# Patient Record
Sex: FEMALE | Race: WHITE
[De-identification: names, ages, dates, MRNs, and addresses within clinical notes are randomized per-mention and may not be internally consistent; named-entity substitution may affect disease eponyms.]

---

## 2020-06-05 ENCOUNTER — HOSPITAL ENCOUNTER (INPATIENT)
Dept: HOSPITAL 53 - M PCU | Age: 85
LOS: 3 days | Discharge: NURSING, REHAB OR LONG-TERM CARE | DRG: 481 | End: 2020-06-08
Attending: INTERNAL MEDICINE | Admitting: INTERNAL MEDICINE
Payer: MEDICARE

## 2020-06-05 VITALS — BODY MASS INDEX: 26.45 KG/M2 | WEIGHT: 158.73 LBS | HEIGHT: 65 IN

## 2020-06-05 VITALS — DIASTOLIC BLOOD PRESSURE: 72 MMHG | SYSTOLIC BLOOD PRESSURE: 150 MMHG

## 2020-06-05 DIAGNOSIS — Z79.82: ICD-10-CM

## 2020-06-05 DIAGNOSIS — F41.9: ICD-10-CM

## 2020-06-05 DIAGNOSIS — R32: ICD-10-CM

## 2020-06-05 DIAGNOSIS — M19.90: ICD-10-CM

## 2020-06-05 DIAGNOSIS — Z95.0: ICD-10-CM

## 2020-06-05 DIAGNOSIS — I48.20: ICD-10-CM

## 2020-06-05 DIAGNOSIS — Z79.899: ICD-10-CM

## 2020-06-05 DIAGNOSIS — K21.9: ICD-10-CM

## 2020-06-05 DIAGNOSIS — E11.40: ICD-10-CM

## 2020-06-05 DIAGNOSIS — Z88.5: ICD-10-CM

## 2020-06-05 DIAGNOSIS — Z91.040: ICD-10-CM

## 2020-06-05 DIAGNOSIS — E03.9: ICD-10-CM

## 2020-06-05 DIAGNOSIS — I12.9: ICD-10-CM

## 2020-06-05 DIAGNOSIS — K44.9: ICD-10-CM
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
## 2020-06-05 DIAGNOSIS — N18.3: ICD-10-CM

## 2020-06-05 DIAGNOSIS — S32.039A: ICD-10-CM

## 2020-06-05 DIAGNOSIS — S72.21XA: Primary | ICD-10-CM

## 2020-06-05 DIAGNOSIS — Z88.0: ICD-10-CM

## 2020-06-05 DIAGNOSIS — R29.6: ICD-10-CM

## 2020-06-05 DIAGNOSIS — D72.829: ICD-10-CM

## 2020-06-05 DIAGNOSIS — I25.10: ICD-10-CM

## 2020-06-05 DIAGNOSIS — E78.5: ICD-10-CM
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
## 2020-06-05 DIAGNOSIS — H83.09: ICD-10-CM

## 2020-06-05 LAB
ALBUMIN SERPL BCG-MCNC: 2.8 GM/DL (ref 3.2–5.2)
ALT SERPL W P-5'-P-CCNC: 18 U/L (ref 12–78)
BILIRUB SERPL-MCNC: 0.4 MG/DL (ref 0.2–1)
BUN SERPL-MCNC: 27 MG/DL (ref 7–18)
CALCIUM SERPL-MCNC: 9 MG/DL (ref 8.8–10.2)
CHLORIDE SERPL-SCNC: 101 MEQ/L (ref 98–107)
CO2 SERPL-SCNC: 28 MEQ/L (ref 21–32)
CREAT SERPL-MCNC: 1.21 MG/DL (ref 0.55–1.3)
GFR SERPL CREATININE-BSD FRML MDRD: 45 ML/MIN/{1.73_M2} (ref 32–?)
GLUCOSE SERPL-MCNC: 127 MG/DL (ref 70–100)
HCT VFR BLD AUTO: 35 % (ref 36–47)
HGB BLD-MCNC: 11.7 G/DL (ref 12–15.5)
INR PPP: 1.53
MCH RBC QN AUTO: 27.7 PG (ref 27–33)
MCHC RBC AUTO-ENTMCNC: 33.4 G/DL (ref 32–36.5)
MCV RBC AUTO: 82.9 FL (ref 80–96)
PLATELET # BLD AUTO: 353 10^3/UL (ref 150–450)
POTASSIUM SERPL-SCNC: 4 MEQ/L (ref 3.5–5.1)
PROT SERPL-MCNC: 6.3 GM/DL (ref 6.4–8.2)
PROTHROMBIN TIME: 18.1 SECONDS (ref 11.8–14)
RBC # BLD AUTO: 4.22 10^6/UL (ref 4–5.4)
SODIUM SERPL-SCNC: 136 MEQ/L (ref 136–145)
WBC # BLD AUTO: 24.2 10^3/UL (ref 4–10)

## 2020-06-05 RX ADMIN — METOPROLOL TARTRATE SCH MG: 50 TABLET, FILM COATED ORAL at 22:28

## 2020-06-05 RX ADMIN — MECLIZINE HYDROCHLORIDE SCH MG: 25 TABLET ORAL at 22:27

## 2020-06-05 RX ADMIN — SODIUM CHLORIDE SCH UNITS: 4.5 INJECTION, SOLUTION INTRAVENOUS at 22:28

## 2020-06-05 RX ADMIN — DOCUSATE SODIUM,SENNOSIDES SCH TAB: 50; 8.6 TABLET, FILM COATED ORAL at 22:28

## 2020-06-05 RX ADMIN — ATORVASTATIN CALCIUM SCH MG: 20 TABLET, FILM COATED ORAL at 22:28

## 2020-06-05 RX ADMIN — GABAPENTIN SCH MG: 100 CAPSULE ORAL at 22:28

## 2020-06-05 NOTE — HPEPDOC
General


Date of Admission


2020 at 19:35


Date of Service:  2020


Chief Complaint


The patient is a 85-year-old female admitted with a reason for visit of Rt 

Femoral Neck Fx.


Source:  Patient, RN/MD, Old records





History of Present Illness


85 year old female was admitted initially to [facility] rehabilitation on 

20 after a fall in her senior living apartment when she fell forward and 

to the right while trying to pull a rug which was stuck below the chair leg and 

was found to have L3 vertebral inferior end plate fracture. She was getting pain

control and rehab there. This am she had another fall in the shower at the rehab

unit. She slipped on wet floor when she just stepped to the side and went down 

on her right hip and heard a snap. Xray of the pelvis there showed a femoral 

neck fracture so she was transferred here. She complains of pain in the right 

groin about 6/10 sharp aching in nature going down to the thigh associated with 

muscle spasms on minimal movement.





Past Medical History


Medical History


L3 inferior plate fracture as per CT on 20 new since 19


Recurrent falls


PAD s/p bilateral leg bypass--bilateral illiac occlusion


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


A fib s/p ablasion


Pacemaker


Hypertension


Hyperlipidemia


CAD


DM


Neuropathy


hypothyroid


OA


Chronic back pain


Vertigo/labrynthitis


GERD/ hiatal hernia


urge incontinence


CKD


Lumber spinal stenosis


Anxiety


seasonal allergies


Surgical History


cholecystectomy, hystrectomy, pacemaker, cardiac ablation, liac graft, cervical 

spice fixed with tiff 7 years ago after a fall and trauma.





Family History


Significant Family History:  Cancer (blood cancer daughter now ), 

Diabetes (sister), Heart disease (father, brothers, 2 sisters.)





Social History


* Smoker:  Denies


Alcohol:  Denies


Drugs:  denies





A-FIB/CHADSVASC


A-FIB History


Current/History of A-Fib/PAF?:  Yes


Current PO Anticoag Therapy:  Yes





Review of Systems


Constitutional:  Denies: Chills, Fever, Night Sweats


Eyes:  Denies: Pain, Vision change


ENT:  Denies: Head Aches, Ear Pain, Dysphagia


Skin:  Denies: Rash, Lesions, Breakdown


Pulmonary:  Denies: Dyspnea, Cough


Cardiovascular:  Reports: Lt Headedness; 


   Denies: Chest Pain, Palpitations, Orthopnea, Paroxysmal Noc. Dyspnea


Gastrointestinal:  Reports: Constipation; 


   Denies: Nausea, Vomiting, Abdominal Pain


Genitourinary:  Reports: Incontinence, Other Symptoms (munoz in place)


Hematologic:  Denies: Bruising, Bleeding Excessively


Musculoskeletal:  Reports: Back Pain, Joint Pain, Spasms





Physical Examination


General Exam:  Positive: Alert, Cooperative, No Acute Distress


Eye Exam:  Positive: PERRLA, Conjunctiva & lids normal, EOMI; 


   Negative: Sclera icteric


ENT Exam:  Positive: Atraumatic, Mucous membr. moist/pink, Pharynx Normal


Neck Exam:  Positive: Other (neck difficult to move due to prior surgery); 


   Negative: JVD, thyromegaly


Chest Exam:  Positive: Clear to auscultation, Normal air movement


Heart Exam:  Positive: Rate Normal, Regular Rhythm, Normal S1, Normal S2; 


   Negative: Murmurs, Rubs


Telemetry:  Positive: Other Telemetry: (paced)


Abdomen Exam:  Positive: Normal bowel sounds, Soft; 


   Negative: Tenderness, Hepatospenomegaly


Extremity Exam:  Positive: Normal pulses, Tenderness (right groin), Swelling 

(right groin and thigh); 


   Negative: Clubbing, Cyanosis, Edema


Skin Exam:  Positive: Nl turgor and temperature; 


   Negative: Breakdown, Lesion


Neuro Exam:  Positive: Normal Speech, Strength at 5/5 X4 ext, Normal Tone





 Assessment/Plan


85 year old female was admitted initially to [facility] skilled rehabilitation on 

20 after a fall in her senior living apartment when she fell forward and 

to the right while trying to pull a rug which was stuck below the chair leg and 

was found to have L3 vertebral inferior end plate fracture. She was getting pain

control and rehab there. This am she had another fall in the shower at the rehab

unit. She slipped on wet floor when she just stepped to the side and went down 

on her right hip and heard a snap. Xray of the pelvis there showed a femoral 

neck fracture so she was transferred here.





Right fracture neck femur


will get hip and femur xray


Hold eliquis, cxr and ekg


Hold Valsartan on the night prior to surgery. 


pin control with narcotics, zofran 


dvt prophylaxis heparin. 


Will have to wait at least 48 hours before cleared for surgery. Last dose of 

eliquis 20 am. 


COVID test. 


Bed rest, munoz


Ortho Dr Bland. 





L3 inferior plate fracture as per CT on 20 new since 19


pain control with oxycodone, morphine tramadol





Leucocytosis


reactive demargination and also due to steroids


patient was getting methyl prednisone at the other hospital. 





PAD s/p bilateral leg bypass--bilateral illiac occlusion


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


ASA, statin, 





A fib s/p ablation/Pacemaker


on eliquis , now on hold


metoprolol





Hypertension


metoprolol, amlodipine,valsartan





Hyperlipidemia


statin





CAD


ASA, statin, metoprolol





DM


patient reported borderline not on any eds


sugars controlled





Neuropathy/ Radiculopathy/Spinal canal stenosis/recurrent falls


gabapentin





hypothyroid


synthroid





Vertigo/labrynthitis


meclizine prn





GERD/ hiatal hernia


PPI





urge incontinence


now has munoz, will restart oxybutinin after surgery





CKD


stable, avoid NSAIDS


Has received multiple contrast studies at [place] over the past week


Last one was on 20 had a CT spine with contrast





Anxiety


buspirone





Plan / VTE


VTE Prophylaxis Ordered?:  Yes











ABBE RESTREPO MD                    2020 20:17

## 2020-06-06 VITALS — DIASTOLIC BLOOD PRESSURE: 65 MMHG | SYSTOLIC BLOOD PRESSURE: 142 MMHG

## 2020-06-06 VITALS — DIASTOLIC BLOOD PRESSURE: 63 MMHG | SYSTOLIC BLOOD PRESSURE: 141 MMHG

## 2020-06-06 VITALS — DIASTOLIC BLOOD PRESSURE: 65 MMHG | SYSTOLIC BLOOD PRESSURE: 145 MMHG

## 2020-06-06 VITALS — SYSTOLIC BLOOD PRESSURE: 140 MMHG | DIASTOLIC BLOOD PRESSURE: 63 MMHG

## 2020-06-06 VITALS — DIASTOLIC BLOOD PRESSURE: 63 MMHG | SYSTOLIC BLOOD PRESSURE: 143 MMHG

## 2020-06-06 VITALS — SYSTOLIC BLOOD PRESSURE: 152 MMHG | DIASTOLIC BLOOD PRESSURE: 68 MMHG

## 2020-06-06 LAB
BASOPHILS # BLD AUTO: 0.1 10^3/UL (ref 0–0.2)
BASOPHILS NFR BLD AUTO: 0.5 % (ref 0–1)
BUN SERPL-MCNC: 25 MG/DL (ref 7–18)
CALCIUM SERPL-MCNC: 8.9 MG/DL (ref 8.8–10.2)
CHLORIDE SERPL-SCNC: 103 MEQ/L (ref 98–107)
CO2 SERPL-SCNC: 27 MEQ/L (ref 21–32)
CREAT SERPL-MCNC: 1.14 MG/DL (ref 0.55–1.3)
EOSINOPHIL # BLD AUTO: 0 10^3/UL (ref 0–0.5)
EOSINOPHIL NFR BLD AUTO: 0.2 % (ref 0–3)
GFR SERPL CREATININE-BSD FRML MDRD: 48.2 ML/MIN/{1.73_M2} (ref 32–?)
GLUCOSE SERPL-MCNC: 105 MG/DL (ref 70–100)
HCT VFR BLD AUTO: 33.9 % (ref 36–47)
HGB BLD-MCNC: 11.1 G/DL (ref 12–15.5)
LYMPHOCYTES # BLD AUTO: 1.7 10^3/UL (ref 1.5–5)
LYMPHOCYTES NFR BLD AUTO: 8.4 % (ref 24–44)
MCH RBC QN AUTO: 27.2 PG (ref 27–33)
MCHC RBC AUTO-ENTMCNC: 32.7 G/DL (ref 32–36.5)
MCV RBC AUTO: 83.1 FL (ref 80–96)
MONOCYTES # BLD AUTO: 1.8 10^3/UL (ref 0–0.8)
MONOCYTES NFR BLD AUTO: 9.4 % (ref 0–5)
NEUTROPHILS # BLD AUTO: 15.8 10^3/UL (ref 1.5–8.5)
NEUTROPHILS NFR BLD AUTO: 81.1 % (ref 36–66)
PLATELET # BLD AUTO: 338 10^3/UL (ref 150–450)
POTASSIUM SERPL-SCNC: 4 MEQ/L (ref 3.5–5.1)
RBC # BLD AUTO: 4.08 10^6/UL (ref 4–5.4)
SODIUM SERPL-SCNC: 138 MEQ/L (ref 136–145)
WBC # BLD AUTO: 19.6 10^3/UL (ref 4–10)

## 2020-06-06 RX ADMIN — DOCUSATE SODIUM,SENNOSIDES SCH TAB: 50; 8.6 TABLET, FILM COATED ORAL at 08:51

## 2020-06-06 RX ADMIN — MECLIZINE HYDROCHLORIDE SCH MG: 25 TABLET ORAL at 21:29

## 2020-06-06 RX ADMIN — SODIUM CHLORIDE SCH UNITS: 4.5 INJECTION, SOLUTION INTRAVENOUS at 08:48

## 2020-06-06 RX ADMIN — MORPHINE SULFATE PRN MG: 2 INJECTION, SOLUTION INTRAMUSCULAR; INTRAVENOUS at 12:49

## 2020-06-06 RX ADMIN — MECLIZINE HYDROCHLORIDE SCH MG: 25 TABLET ORAL at 15:34

## 2020-06-06 RX ADMIN — MORPHINE SULFATE PRN MG: 2 INJECTION, SOLUTION INTRAMUSCULAR; INTRAVENOUS at 09:24

## 2020-06-06 RX ADMIN — GABAPENTIN SCH MG: 100 CAPSULE ORAL at 21:29

## 2020-06-06 RX ADMIN — PANTOPRAZOLE SODIUM SCH MG: 40 TABLET, DELAYED RELEASE ORAL at 08:50

## 2020-06-06 RX ADMIN — METOPROLOL TARTRATE SCH MG: 50 TABLET, FILM COATED ORAL at 21:29

## 2020-06-06 RX ADMIN — DOCUSATE SODIUM,SENNOSIDES SCH TAB: 50; 8.6 TABLET, FILM COATED ORAL at 21:28

## 2020-06-06 RX ADMIN — SODIUM CHLORIDE SCH UNITS: 4.5 INJECTION, SOLUTION INTRAVENOUS at 21:29

## 2020-06-06 RX ADMIN — GABAPENTIN SCH MG: 100 CAPSULE ORAL at 08:51

## 2020-06-06 RX ADMIN — MECLIZINE HYDROCHLORIDE SCH MG: 25 TABLET ORAL at 08:49

## 2020-06-06 RX ADMIN — METOPROLOL TARTRATE SCH MG: 50 TABLET, FILM COATED ORAL at 08:49

## 2020-06-06 RX ADMIN — MAGNESIUM OXIDE SCH MG: 400 TABLET ORAL at 08:49

## 2020-06-06 RX ADMIN — MORPHINE SULFATE PRN MG: 2 INJECTION, SOLUTION INTRAMUSCULAR; INTRAVENOUS at 16:50

## 2020-06-06 RX ADMIN — ATORVASTATIN CALCIUM SCH MG: 20 TABLET, FILM COATED ORAL at 21:29

## 2020-06-06 NOTE — IPNPDOC
Subjective


Date Seen


The patient was seen on 6/6/20.





Subjective


Chief Complaint/HPI


Pt was examined at bedside. She reported 10/10 right hip pain and in her right 

inguinal/pubis region as well as 5/10 back pain. Reported right medial mallolus 

region numbness that started after the most recent fall, also reported b/l leg 

achiness right worse than left. Pt reported some right sided chest wall pain 

under her breast which happened after the fall and reported she hit her right 

chest as well. Denies any fever, chills, dyspnea, chest pain, or palpitation. 

Reported she is supposed to walk with a cane at home and reported with the most 

recent fall she was walking without a walker; denies any dizziness, 

lightheadedness, or vertigo just prior to fall or now. Reported chronic 

constipation


General:  Denies: Chills


Constitutional:  Denies: Chills, Fever


Pulmonary:  Denies: Dyspnea


Cardiovascular:  Denies: Chest Pain, Palpitations


Gastrointestinal:  Reports: Constipation; 


   Denies: Abdominal Pain


Musculoskeletal:  Reports: Back Pain, Leg Pain (Right lateral hip pain), Other 

Symptoms (right pubis region pain)


Neurological:  Reports: Numbness (right medial malleolus region); 


   Denies: Change in speech





Objective


Physical Examination


General Exam:  Positive: Alert, Cooperative, Mild Distress


Eye Exam:  Positive: PERRLA, Conjunctiva & lids normal; 


   Negative: Sclera icteric


ENT Exam:  Positive: Atraumatic, Mucous membr. moist/pink


Neck Exam:  Positive: Other (well healed scar noted on left side of neck); 


   Negative: JVD, thyromegaly


Chest Exam:  Positive: Clear to auscultation, Normal air movement


Heart Exam:  Positive: Rate Normal, Regular Rhythm, Normal S1, Normal S2; 


   Negative: Murmurs, Rubs


Abdomen Exam:  Positive: Normal bowel sounds, Soft; 


   Negative: Tenderness, Hepatospenomegaly


Extremity Exam:  Positive: Normal pulses, Tenderness (right groin), Swelling 

(right groin and thigh); 


   Negative: Clubbing, Cyanosis, Edema


Skin Exam:  Positive: Nl turgor and temperature, Other skin issue (no 

ecchymosis, pertechiae, or open lesion); 


   Negative: Breakdown, Lesion


Neuro Exam:  Positive: Normal Speech, Normal Tone, Other (moving all 4 

extremities); 


   Negative: Sensation Intact (decreased sensation inferior to right medial 

malloelus, sensation intac in the rest of right foot and ankle)


Psych Exam:  Positive: Mental status NL, Mood NL, Anxiety, Memory Intact, 

Oriented x 3





Assessment /Plan


Assessment


85 year old female who was admitted initially to [facility] rehab on 

06/01/20 after a fall in her senior living apartment; she fell forward toward 

right while trying to pull a rug which was stuck below the chair leg and was 

found to have L3 vertebral inferior end plate fracture. She was getting pain 

control and rehab at [facility], and in the morning of this hospital

admission/transfer to [facility], pt another fall in the shower at the rehab unit.  Pt 

slipped on wet floor and fell on her right hip and heard a snap. Xray of the 

pelvis there showed a right femoral neck fracture





1. Right subtrochanteric femur fracture 2/2 mechanical fall. Hip and femur xray 

upon admission ordered formal result pending, appeared to have subtrochanteric 

fracture noted on right hip X ray. Pt also reported right medial malleolus 

region numbness after most recent fall however has been on gabapentin at home. 


Hold eliquis for 48 hrs, last taken 6/5/2020, cxr and ekg. Hold Valsartan on the

night prior to surgery. Pain control with narcotics, zofran PRNWill have to wait

at least 48 hours after last eliquis dose before cleared for surgery. Last dose 

of eliquis 6/5/20, pt on heparin for DVT prophylaxis.


COVID test neg. Cont Bed rest, munoz, plan PT after possible surgery/ortho 

recommendation. Ortho Dr Bland. 





2. L3 inferior plate fracture 2/2 mechanical fall, shown on CT on 06/01/20, new 

compared to 9/5/19. Cont pain control with oxycodone, morphine tramadol





3. Mechanical fall. Pt was walking without a walker, denied any vertigo, 

dizziness or lightheadedness prior to most recent fall. Denies any other symptom

besides extremity pain and RLE numbness after fall. 





3. Leucocytosis, reactive demargination and also due to steroids. patient was 

getting methyl prednisone at the other hospital. 





4. PAD s/p bilateral leg bypass--bilateral illiac occlusion. Carotid artery dis 

S/p left carotid endarterectomy. Cont ASA and statin





5. Chronic A fib s/p ablation/Pacemaker. Hx of ANDRIA cho, on eliquis at home, now 

on hold for possible procedure with ortho. Cont metoprolol, vitals stable





6. Hypertension. Cont home med metoprolol, amlodipine,valsartan





7. Hyperlipidemia. Cont home med  statin





9. CAD. Cont chloe emed ASA, statin, and metoprolol





10. DM, not on home med. Patient reported borderline not on any eds. sugars 

roughly stable





11. Neuropathy/ Radiculopathy/Spinal canal stenosis/recurrent falls. Cont home 

med gabapentin





12. hypothyroid. Cont home med synthroid





13. Vertigo/labrynthitis. PMH of vertigo, cont home med meclizine prn





14. GERD/ hiatal hernia. Cont PPI





15. Urge incontinence. Now on munoz, will restart oxybutinin after surgery





16. CKD 3A, noted to be stable. Avoid NSAIDS. Cont to trend BMP





17. Anxiety. Cont home med buspirone





Plan/VTE


VTE Prophylaxis Ordered?:  Yes





Plan


Diet:  Continue Current


Activity:  Bedrest


Therapy:  PT (pending orthopedic surgery decision)


Diagnostics:  Repeat Labs in AM





Disposition


84 yo female L5 fracture and right femur neck fracture after mechanical fall on 

eliquis at home for ANDRIA cho, hold eliquis for possible orthopedic procedure





VS, I&O, 24H, Fishbone


Vital Signs/I&O





Vital Signs








  Date Time  Temp Pulse Resp B/P (MAP) Pulse Ox O2 Delivery O2 Flow Rate FiO2


 


6/6/20 09:24  60 20 130/60    


 


6/6/20 07:29 97.2    95 Room Air  














I&O- Last 24 Hours up to 6 AM 


 


 6/6/20





 05:59


 


Output Total 150 ml


 


Balance -150 ml











Laboratory Data


24H LABS


Laboratory Tests 2


6/5/20 20:10: 


Nucleated Red Blood Cells % (auto) 0.0, Prothrombin Time 18.1H, Prothromb Time 

International Ratio 1.53, Anion Gap 7L, Glomerular Filtration Rate 45.0, Calcium

Level 9.0, Total Bilirubin 0.4, Aspartate Amino Transf (AST/SGOT) 19, Alanine 

Aminotransferase (ALT/SGPT) 18, Alkaline Phosphatase 75, Total Protein 6.3L, 

Albumin 2.8L, Albumin/Globulin Ratio 0.8L


6/5/20 21:34: 


Urine Color YELLOW, Urine Appearance HAZY, Urine pH 5.0, Urine Specific Gravity 

1.021, Urine Protein NEGATIVE, Urine Glucose (UA) NEGATIVE, Urine Ketones 

NEGATIVE, Urine Blood NEGATIVE, Urine Nitrite NEGATIVE, Urine Bilirubin 

NEGATIVE, Urine Urobilinogen 0.2, Urine Leukocyte Esterase NEGATIVE, Urine WBC 

(Auto) 3, Urine RBC (Auto) 1, Urine Hyaline Casts (Auto) 8, Urine Bacteria 

(Auto) NEGATIVE, Urine Squamous Epithelial Cells 0, Urine Mucus (Auto) SMALL, 

Urine Sperm (Auto) 


6/6/20 04:42: 


Nucleated Red Blood Cells % (auto) 0.0, Anion Gap 8, Glomerular Filtration Rate 

48.2, Calcium Level 8.9, Immature Granulocyte % (Auto) 0.4, Neutrophils (%) 

(Auto) 81.1H, Lymphocytes (%) (Auto) 8.4L, Monocytes (%) (Auto) 9.4H, 

Eosinophils (%) (Auto) 0.2, Basophils (%) (Auto) 0.5, Neutrophils # (Auto) 

15.8H, Lymphocytes # (Auto) 1.7, Monocytes # (Auto) 1.8H, Eosinophils # (Auto) 

0.0, Basophils # (Auto) 0.1


CBC/BMP


Laboratory Tests


6/5/20 20:10








6/6/20 04:42








Microbiology





Microbiology


6/6/20 Respiratory Virus Panel (PCR) (MAIKOL) - Final, Complete











DARNELL VACA DO                  Jun 6, 2020 10:15

## 2020-06-06 NOTE — ECGEPIP
[facility]

                                       

                                       Test Date:    2020

Pat Name:     KIARA SANCHEZ         Department:   

Patient ID:   [id number]                 Room:         -58

Gender:       Female                   Technician:   NHUNG

:          1935               Requested By: ABBE RESTREPO 

Order Number: [id number]     Reading MD:   Washington Campbell

                                 Measurements

Intervals                              Axis          

Rate:         60                       P:            -55

MS:           286                      QRS:          24

QRSD:         103                      T:            -81

QT:           455                                    

QTc:          457                                    

                           Interpretive Statements

Atrial paced rhythm,

ST DEVIATION AND MODERATE T-WAVE ABNORMALITY, CONSIDER ANTEROLATERAL ISCHEMIA

ST DEVIATION AND MODERATE T-WAVE ABNORMALITY, CONSIDER INFERIOR ISCHEMIA

No prior ECG available for comparison at the time of interpretation.

Electronically Signed on 2020 15:50:34 EDT by Washington Campbell

## 2020-06-07 VITALS — SYSTOLIC BLOOD PRESSURE: 142 MMHG | DIASTOLIC BLOOD PRESSURE: 64 MMHG

## 2020-06-07 VITALS — DIASTOLIC BLOOD PRESSURE: 66 MMHG | SYSTOLIC BLOOD PRESSURE: 149 MMHG

## 2020-06-07 VITALS — DIASTOLIC BLOOD PRESSURE: 68 MMHG | SYSTOLIC BLOOD PRESSURE: 152 MMHG

## 2020-06-07 VITALS — SYSTOLIC BLOOD PRESSURE: 138 MMHG | DIASTOLIC BLOOD PRESSURE: 52 MMHG

## 2020-06-07 VITALS — SYSTOLIC BLOOD PRESSURE: 138 MMHG | DIASTOLIC BLOOD PRESSURE: 56 MMHG

## 2020-06-07 VITALS — SYSTOLIC BLOOD PRESSURE: 140 MMHG | DIASTOLIC BLOOD PRESSURE: 58 MMHG

## 2020-06-07 VITALS — DIASTOLIC BLOOD PRESSURE: 70 MMHG | SYSTOLIC BLOOD PRESSURE: 160 MMHG

## 2020-06-07 VITALS — DIASTOLIC BLOOD PRESSURE: 75 MMHG | SYSTOLIC BLOOD PRESSURE: 158 MMHG

## 2020-06-07 VITALS — DIASTOLIC BLOOD PRESSURE: 68 MMHG | SYSTOLIC BLOOD PRESSURE: 124 MMHG

## 2020-06-07 LAB
BASOPHILS # BLD AUTO: 0.2 10^3/UL (ref 0–0.2)
BASOPHILS NFR BLD AUTO: 1.7 % (ref 0–1)
BUN SERPL-MCNC: 27 MG/DL (ref 7–18)
CALCIUM SERPL-MCNC: 8.8 MG/DL (ref 8.8–10.2)
CHLORIDE SERPL-SCNC: 103 MEQ/L (ref 98–107)
CO2 SERPL-SCNC: 28 MEQ/L (ref 21–32)
CREAT SERPL-MCNC: 1.16 MG/DL (ref 0.55–1.3)
EOSINOPHIL # BLD AUTO: 0.7 10^3/UL (ref 0–0.5)
EOSINOPHIL NFR BLD AUTO: 6 % (ref 0–3)
GFR SERPL CREATININE-BSD FRML MDRD: 47.3 ML/MIN/{1.73_M2} (ref 32–?)
GLUCOSE SERPL-MCNC: 85 MG/DL (ref 70–100)
HCT VFR BLD AUTO: 35.6 % (ref 36–47)
HGB BLD-MCNC: 11.6 G/DL (ref 12–15.5)
LYMPHOCYTES # BLD AUTO: 1.9 10^3/UL (ref 1.5–5)
LYMPHOCYTES NFR BLD AUTO: 17 % (ref 24–44)
MCH RBC QN AUTO: 27.8 PG (ref 27–33)
MCHC RBC AUTO-ENTMCNC: 32.6 G/DL (ref 32–36.5)
MCV RBC AUTO: 85.4 FL (ref 80–96)
MONOCYTES # BLD AUTO: 1.5 10^3/UL (ref 0–0.8)
MONOCYTES NFR BLD AUTO: 13.6 % (ref 0–5)
NEUTROPHILS # BLD AUTO: 6.8 10^3/UL (ref 1.5–8.5)
NEUTROPHILS NFR BLD AUTO: 61.3 % (ref 36–66)
PLATELET # BLD AUTO: 299 10^3/UL (ref 150–450)
POTASSIUM SERPL-SCNC: 4.4 MEQ/L (ref 3.5–5.1)
RBC # BLD AUTO: 4.17 10^6/UL (ref 4–5.4)
SODIUM SERPL-SCNC: 136 MEQ/L (ref 136–145)
WBC # BLD AUTO: 11.1 10^3/UL (ref 4–10)

## 2020-06-07 PROCEDURE — 0QS606Z REPOSITION RIGHT UPPER FEMUR WITH INTRAMEDULLARY INTERNAL FIXATION DEVICE, OPEN APPROACH: ICD-10-PCS | Performed by: ORTHOPAEDIC SURGERY

## 2020-06-07 RX ADMIN — METOPROLOL TARTRATE SCH MG: 50 TABLET, FILM COATED ORAL at 21:40

## 2020-06-07 RX ADMIN — TELMISARTAN SCH MG: 20 TABLET ORAL at 21:40

## 2020-06-07 RX ADMIN — DOCUSATE SODIUM,SENNOSIDES SCH TAB: 50; 8.6 TABLET, FILM COATED ORAL at 09:19

## 2020-06-07 RX ADMIN — ATORVASTATIN CALCIUM SCH MG: 20 TABLET, FILM COATED ORAL at 21:41

## 2020-06-07 RX ADMIN — MECLIZINE HYDROCHLORIDE SCH MG: 25 TABLET ORAL at 09:19

## 2020-06-07 RX ADMIN — SODIUM CHLORIDE SCH MLS/HR: 9 INJECTION, SOLUTION INTRAVENOUS at 12:00

## 2020-06-07 RX ADMIN — PANTOPRAZOLE SODIUM SCH MG: 40 TABLET, DELAYED RELEASE ORAL at 09:19

## 2020-06-07 RX ADMIN — MAGNESIUM OXIDE SCH MG: 400 TABLET ORAL at 09:00

## 2020-06-07 RX ADMIN — SODIUM CHLORIDE SCH UNITS: 4.5 INJECTION, SOLUTION INTRAVENOUS at 20:36

## 2020-06-07 RX ADMIN — GABAPENTIN SCH MG: 100 CAPSULE ORAL at 09:20

## 2020-06-07 RX ADMIN — TELMISARTAN SCH MG: 20 TABLET ORAL at 02:48

## 2020-06-07 RX ADMIN — SODIUM CHLORIDE SCH UNITS: 4.5 INJECTION, SOLUTION INTRAVENOUS at 09:00

## 2020-06-07 RX ADMIN — SODIUM CHLORIDE SCH MLS/HR: 9 INJECTION, SOLUTION INTRAVENOUS at 23:20

## 2020-06-07 RX ADMIN — CLINDAMYCIN PHOSPHATE SCH MLS/HR: 600 INJECTION, SOLUTION INTRAVENOUS at 23:20

## 2020-06-07 RX ADMIN — METOPROLOL TARTRATE SCH MG: 50 TABLET, FILM COATED ORAL at 09:19

## 2020-06-07 RX ADMIN — MORPHINE SULFATE PRN MG: 2 INJECTION, SOLUTION INTRAMUSCULAR; INTRAVENOUS at 09:18

## 2020-06-07 RX ADMIN — MECLIZINE HYDROCHLORIDE SCH MG: 25 TABLET ORAL at 16:00

## 2020-06-07 RX ADMIN — GABAPENTIN SCH MG: 100 CAPSULE ORAL at 21:41

## 2020-06-07 RX ADMIN — DOCUSATE SODIUM,SENNOSIDES SCH TAB: 50; 8.6 TABLET, FILM COATED ORAL at 21:41

## 2020-06-07 RX ADMIN — MECLIZINE HYDROCHLORIDE SCH MG: 25 TABLET ORAL at 21:41

## 2020-06-07 RX ADMIN — MORPHINE SULFATE PRN MG: 2 INJECTION, SOLUTION INTRAMUSCULAR; INTRAVENOUS at 15:17

## 2020-06-07 NOTE — IPNPDOC
Date Seen


The patient was seen on 6/7/20.





Progress Note


SUBJECTIVE: 85-year-old female with multiple medical comorbidities including 

coronary artery disease, hypertension, hyperlipidemia, diabetes mellitus, 

hypothyroidism and atrial fibrillation was admitted for right femoral neck 

fracture secondary to mechanical fall. Patient is on anticoagulation with 

Eliquis, which requires her to wait 48 hours prior to surgical repair of femoral

fracture. Patient has been evaluated by orthopedic surgery and surgery scheduled

for later today. Patient is comfortable in the morning, without any complaints, 

denies any shortness of breath, chest pain, nausea, vomiting, diarrhea or 

constipation. Risks and benefits of surgery were discussed with the patient, 

including MI/CVA/death. Patient understands the risks but wishes to continue 

with surgery as scheduled.





10 point review of system is negative except for above





PHYSICAL EXAMINATION:


VITAL SIGNS: Please see below.


GENERAL: No distress


HEENT: Normocephalic, atraumatic, moist mucous membranes


NECK: Supple


CARDIOVASCULAR EXAMINATION: S1, S2


RESPIRATORY EXAMINATION: Diminished in the bases, no wheezing


ABDOMINAL EXAMINATION: Soft, nontender, nondistended, positive bowel sounds


EXTREMITIES: Range of motion limited due to pain


SKIN: No rash


NEUROLOGICAL EXAMINATION: Alert and oriented 3, no focal deficits 


PSYCHIATRIC EXAMINATION: Calm and cooperative





LABORATORY DATA, IMAGING STUDIES, MICROBIOLOGY: Please see below.





ASSESSMENT AND PLAN: 85-year-old female with multiple medical comorbidities is 

admitted for right femoral neck fracture secondary to mechanical fall.





PROBLEMS:


1. Right femoral neck fracture: [Second agent mechanical fall, evaluated by 

orthopedic surgery, plan for surgical repair later today, anticoagulation has 

been held for 48 hours, pain control. Given patient's age and extensive medical 

comorbidities. Patient is at a high risk for cardiovascular complications, 

discussed with patient in detail. She understands the risks and benefits, wishes

 to proceed with surgery as planned, we'll obtain postop EKG in PACU followed by

 daily Troponin for the next 3 days.





2. Hypertension: Continue Norvasc, metoprolol and telmisartan.





3. Hyperlipidemia: Continue statin.





4. Diabetes mellitus: Sliding scale and cigars every 6 hours





5. Coronary artery disease: Continue aspirin, statin and beta blocker





6. Hypothyroidism: Continue levothyroxine





DVT prophylaxis: Heparin subcutaneous


GI prophylaxis: Not needed





VS, I&O, 24H, Fishbone


Vital Signs/I&O





Vital Signs








  Date Time  Temp Pulse Resp B/P (MAP) Pulse Ox O2 Delivery O2 Flow Rate FiO2


 


6/7/20 09:19   18     


 


6/7/20 08:00 97.9 63  124/68 (86) 94 Room Air  














I&O- Last 24 Hours up to 6 AM 


 


 6/7/20





 06:00


 


Intake Total 600 ml


 


Output Total 1000 ml


 


Balance -400 ml











Laboratory Data


24H LABS


Laboratory Tests 2


6/7/20 04:39: 


Immature Granulocyte % (Auto) 0.4, Neutrophils (%) (Auto) 61.3, Lymphocytes (%) 

(Auto) 17.0L, Monocytes (%) (Auto) 13.6H, Eosinophils (%) (Auto) 6.0H, Basophils

 (%) (Auto) 1.7H, Neutrophils # (Auto) 6.8, Lymphocytes # (Auto) 1.9, Monocytes 

# (Auto) 1.5H, Eosinophils # (Auto) 0.7H, Basophils # (Auto) 0.2, Nucleated Red 

Blood Cells % (auto) 0.0, Anion Gap 5L, Glomerular Filtration Rate 47.3, Calcium

 Level 8.8


CBC/BMP


Laboratory Tests


6/7/20 04:39








Microbiology





Microbiology


6/6/20 Respiratory Virus Panel (PCR) (MAIKOL) - Final, Complete











GONDAL,KHUBAIB N. MD            Jun 7, 2020 11:36

## 2020-06-07 NOTE — CR
DATE OF CONSULTATION: 06/06/2020

 

I was asked to evaluate right hip fracture surgery.

 

HISTORY:  Ainsley is an 85-year-old woman.   She was recently transferred from

[facility] skilled rehabilitation unit.  She had fallen while in her

senior living apartment.  She was noted and L3 endplate fracture.  She was on

rehab.  She was in the shower at the rehab unit and slipped on wet floor and

injured the hip, heard a snap and had pain.  Imaging studies of the pelvis

reflected a proximal femur fracture and she was transferred to [facility] for more definitive care.   She is complaining of severe pain in the right

groin and hip area, rated 6/10, sharp and aching in nature and causing muscle

spasms and inability to walk.

 

PAST MEDICAL HISTORY:

Includes L3 inferior endplate fracture on CT scan, appears to be subacute.  Was

not present on 09/05/2019 and there is some increased density evident on new CT

scan of 06/01/2020.  History of recurrent falls.  History of leg bypasses, iliac

occlusion.  Carotid artery surgery. Atrial defibrillator placement, pacemaker

placement, high blood pressure, hyperlipidemia, coronary artery disease,

diabetes, neuropathy, hypothyroidism, arthritis, chronic neck pain, vertigo,

reflux disease, urge incontinence, lumbar stenosis, anxiety.

 

ALLERGIES: Pollen.

 

PAST SURGICAL HISTORY: Including

Cholecystectomy.

Hysterectomy.

Pacemaker.

Cardiac ablation.

Iliac graft.

Cervical fusion.

 

FAMILY HISTORY:  Not contributory.

 

SOCIAL HISTORY:  Nonsmoker.  No alcohol. Does not use any drugs.

 

REVIEW OF SYSTEMS:

CONSTITUTIONAL:   Not complaining of fever, chills or sweats.

Respiratory:  Not complaining of shortness of breath or cough.

Skin:  Not complaining skin rashes or lesions.

Cardiovascular:  Not complaining of chest pain.  Does have some history of

lightheadedness.

Gastrointestinal:  Does report some constipation at times.

Genitourinary:  Reports in continence.

Hematology:  Not complaining of unusual bleeding.

Musculoskeletal:  See History of present illness (HPI).

 

IMAGING:  Plain films of the right hip and femur.  She has got a proximal femur

fracture. This is subtrochanteric, relatively transverse fracture.

 

Medical records are reviewed, additional [facility] medical records including the

admission note and other nodes in the [facility] system.

 

PHYSICAL EXAMINATION:

She is alert and cooperative.  Mood and affect seem to be appropriate.  She is

slightly easily confused.

She seems to be hard-of-hearing otherwise normocephalic, atraumatic.

She is not short of breath.

Cardiac regular.

Abdomen:  Not distended.  Mild obesity.

Extremities:  Right lower extremity shortened warm, well-perfused.  Positive

pulse.  No edema in the ankle.

Skin:  No skin lesions in the right lower extremity or left lower extremity.

Neurologic:  Sensate lower extremities today.

 

IMPRESSION: Subtrochanteric femur fracture.

 

RECOMMENDATIONS:

This 85-year-old, previously ambulator, has a subtrochanteric femur fracture.

The recommendations include medical optimization holding her Eliquis until the

appropriate time frame.  We will add her onto the operating room schedule.  I

anticipate possible surgery tomorrow afternoon if she remains cleared.

 

Surgery recommended: Cephalomedullary femoral nail.

 

CONSENT: Reviewed in detail with the patient including wicho discussion of the

pathology involved, the procedure proposed, alternatives such as doing nothing.

Risks to include but not limited to pain, failure, infection, bleeding, blood

loss, incomplete relief of symptoms, death, limp and other problems.  Also

consented the patient for possible blood transfusion.

 

OVERALL PROGNOSIS:  She may have a difficult time due to her previous history as

well as this current injury.  This patient, in addition to postoperative acute

care will likely recommend inpatient rehabilitation and possible long-term

placement.  She will not be able to resume community living in the same fashion

as previous.  For further detail, please refer to the medical record.

## 2020-06-08 ENCOUNTER — HOSPITAL ENCOUNTER (INPATIENT)
Dept: HOSPITAL 53 - M PM&R | Age: 85
LOS: 22 days | Discharge: NURSING, REHAB OR LONG-TERM CARE | DRG: 560 | End: 2020-06-30
Attending: PHYSICAL MEDICINE & REHABILITATION | Admitting: PHYSICAL MEDICINE & REHABILITATION
Payer: MEDICARE

## 2020-06-08 VITALS — SYSTOLIC BLOOD PRESSURE: 136 MMHG | DIASTOLIC BLOOD PRESSURE: 64 MMHG

## 2020-06-08 VITALS — SYSTOLIC BLOOD PRESSURE: 107 MMHG | DIASTOLIC BLOOD PRESSURE: 53 MMHG

## 2020-06-08 VITALS — HEIGHT: 65 IN | BODY MASS INDEX: 27.55 KG/M2 | WEIGHT: 165.35 LBS

## 2020-06-08 VITALS — DIASTOLIC BLOOD PRESSURE: 64 MMHG | SYSTOLIC BLOOD PRESSURE: 136 MMHG

## 2020-06-08 VITALS — SYSTOLIC BLOOD PRESSURE: 130 MMHG | DIASTOLIC BLOOD PRESSURE: 56 MMHG

## 2020-06-08 VITALS — DIASTOLIC BLOOD PRESSURE: 63 MMHG | SYSTOLIC BLOOD PRESSURE: 131 MMHG

## 2020-06-08 VITALS — DIASTOLIC BLOOD PRESSURE: 61 MMHG | SYSTOLIC BLOOD PRESSURE: 116 MMHG

## 2020-06-08 VITALS — SYSTOLIC BLOOD PRESSURE: 142 MMHG | DIASTOLIC BLOOD PRESSURE: 65 MMHG

## 2020-06-08 VITALS — DIASTOLIC BLOOD PRESSURE: 72 MMHG | SYSTOLIC BLOOD PRESSURE: 152 MMHG

## 2020-06-08 DIAGNOSIS — R29.6: ICD-10-CM

## 2020-06-08 DIAGNOSIS — K62.5: ICD-10-CM

## 2020-06-08 DIAGNOSIS — N18.9: ICD-10-CM

## 2020-06-08 DIAGNOSIS — I48.91: ICD-10-CM

## 2020-06-08 DIAGNOSIS — R42: ICD-10-CM

## 2020-06-08 DIAGNOSIS — E11.22: ICD-10-CM

## 2020-06-08 DIAGNOSIS — Z90.49: ICD-10-CM

## 2020-06-08 DIAGNOSIS — Z91.040: ICD-10-CM

## 2020-06-08 DIAGNOSIS — Z79.899: ICD-10-CM

## 2020-06-08 DIAGNOSIS — K21.9: ICD-10-CM

## 2020-06-08 DIAGNOSIS — Z88.0: ICD-10-CM

## 2020-06-08 DIAGNOSIS — I25.10: ICD-10-CM

## 2020-06-08 DIAGNOSIS — E03.9: ICD-10-CM

## 2020-06-08 DIAGNOSIS — I12.9: ICD-10-CM

## 2020-06-08 DIAGNOSIS — I65.23: ICD-10-CM

## 2020-06-08 DIAGNOSIS — K44.9: ICD-10-CM

## 2020-06-08 DIAGNOSIS — S72.21XD: Primary | ICD-10-CM

## 2020-06-08 DIAGNOSIS — R33.9: ICD-10-CM
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
## 2020-06-08 DIAGNOSIS — M48.061: ICD-10-CM

## 2020-06-08 DIAGNOSIS — Z95.0: ICD-10-CM

## 2020-06-08 DIAGNOSIS — D50.0: ICD-10-CM

## 2020-06-08 DIAGNOSIS — E11.40: ICD-10-CM

## 2020-06-08 DIAGNOSIS — K59.00: ICD-10-CM

## 2020-06-08 DIAGNOSIS — R13.10: ICD-10-CM

## 2020-06-08 DIAGNOSIS — R26.89: ICD-10-CM

## 2020-06-08 DIAGNOSIS — E87.1: ICD-10-CM
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
## 2020-06-08 DIAGNOSIS — Z79.01: ICD-10-CM

## 2020-06-08 DIAGNOSIS — Z88.5: ICD-10-CM

## 2020-06-08 LAB
BASOPHILS # BLD AUTO: 0.1 10^3/UL (ref 0–0.2)
BASOPHILS NFR BLD AUTO: 0.5 % (ref 0–1)
BUN SERPL-MCNC: 26 MG/DL (ref 7–18)
CALCIUM SERPL-MCNC: 8.2 MG/DL (ref 8.8–10.2)
CHLORIDE SERPL-SCNC: 107 MEQ/L (ref 98–107)
CO2 SERPL-SCNC: 26 MEQ/L (ref 21–32)
CREAT SERPL-MCNC: 0.98 MG/DL (ref 0.55–1.3)
EOSINOPHIL # BLD AUTO: 0 10^3/UL (ref 0–0.5)
EOSINOPHIL NFR BLD AUTO: 0.1 % (ref 0–3)
GFR SERPL CREATININE-BSD FRML MDRD: 57.4 ML/MIN/{1.73_M2} (ref 32–?)
GLUCOSE SERPL-MCNC: 109 MG/DL (ref 70–100)
HCT VFR BLD AUTO: 31.6 % (ref 36–47)
HGB BLD-MCNC: 10.5 G/DL (ref 12–15.5)
LYMPHOCYTES # BLD AUTO: 0.8 10^3/UL (ref 1.5–5)
LYMPHOCYTES NFR BLD AUTO: 6.2 % (ref 24–44)
MCH RBC QN AUTO: 28.1 PG (ref 27–33)
MCHC RBC AUTO-ENTMCNC: 33.2 G/DL (ref 32–36.5)
MCV RBC AUTO: 84.5 FL (ref 80–96)
MONOCYTES # BLD AUTO: 1.8 10^3/UL (ref 0–0.8)
MONOCYTES NFR BLD AUTO: 14.5 % (ref 0–5)
NEUTROPHILS # BLD AUTO: 9.8 10^3/UL (ref 1.5–8.5)
NEUTROPHILS NFR BLD AUTO: 78.2 % (ref 36–66)
PLATELET # BLD AUTO: 315 10^3/UL (ref 150–450)
POTASSIUM SERPL-SCNC: 4.9 MEQ/L (ref 3.5–5.1)
RBC # BLD AUTO: 3.74 10^6/UL (ref 4–5.4)
SODIUM SERPL-SCNC: 140 MEQ/L (ref 136–145)
TROPONIN I SERPL-MCNC: < 0.02 NG/ML (ref ?–0.1)
WBC # BLD AUTO: 12.5 10^3/UL (ref 4–10)

## 2020-06-08 RX ADMIN — MECLIZINE HYDROCHLORIDE SCH MG: 25 TABLET ORAL at 16:56

## 2020-06-08 RX ADMIN — CLINDAMYCIN PHOSPHATE SCH MLS/HR: 600 INJECTION, SOLUTION INTRAVENOUS at 05:32

## 2020-06-08 RX ADMIN — DOCUSATE SODIUM,SENNOSIDES SCH TAB: 50; 8.6 TABLET, FILM COATED ORAL at 21:05

## 2020-06-08 RX ADMIN — WHITE PETROLATUM SCH DOSE: 57; 17 PASTE TOPICAL at 21:06

## 2020-06-08 RX ADMIN — PANTOPRAZOLE SODIUM SCH MG: 40 TABLET, DELAYED RELEASE ORAL at 08:05

## 2020-06-08 RX ADMIN — METOPROLOL TARTRATE SCH MG: 50 TABLET, FILM COATED ORAL at 08:05

## 2020-06-08 RX ADMIN — DOCUSATE SODIUM,SENNOSIDES SCH TAB: 50; 8.6 TABLET, FILM COATED ORAL at 08:05

## 2020-06-08 RX ADMIN — ACETAMINOPHEN SCH MG: 500 TABLET ORAL at 21:06

## 2020-06-08 RX ADMIN — APIXABAN SCH MG: 2.5 TABLET, FILM COATED ORAL at 21:06

## 2020-06-08 RX ADMIN — MECLIZINE HYDROCHLORIDE SCH MG: 25 TABLET ORAL at 21:05

## 2020-06-08 RX ADMIN — GABAPENTIN SCH MG: 100 CAPSULE ORAL at 08:04

## 2020-06-08 RX ADMIN — SODIUM CHLORIDE SCH UNITS: 4.5 INJECTION, SOLUTION INTRAVENOUS at 08:04

## 2020-06-08 RX ADMIN — GABAPENTIN SCH MG: 100 CAPSULE ORAL at 21:06

## 2020-06-08 RX ADMIN — MECLIZINE HYDROCHLORIDE SCH MG: 25 TABLET ORAL at 08:04

## 2020-06-08 RX ADMIN — METOPROLOL TARTRATE SCH MG: 50 TABLET, FILM COATED ORAL at 21:00

## 2020-06-08 RX ADMIN — ATORVASTATIN CALCIUM SCH MG: 20 TABLET, FILM COATED ORAL at 21:05

## 2020-06-08 RX ADMIN — WHITE PETROLATUM SCH DOSE: 57; 17 PASTE TOPICAL at 16:00

## 2020-06-08 RX ADMIN — MAGNESIUM OXIDE SCH MG: 400 TABLET ORAL at 08:05

## 2020-06-08 NOTE — REP
REASON:  ORIF right hip

 

2 minutes and 11 seconds of fluoroscopy time was provided to Dr. Wilmer Bland for

the procedure.

 

Previously described fracture has been openly reduced and internally fixed with

intramedullary rods and affixing screws. The alignment is near anatomical. The

distal portion of the femoral neck tiff does not breach the joint space.

 

 

Electronically Signed by

Roque Sharp DO 06/08/2020 03:17 P

## 2020-06-08 NOTE — DS.PDOC
Discharge Summary


General


Date of Admission


Jun 5, 2020 at 19:35


Date of Discharge


06/08/2020





Discharge Summary


PROCEDURES PERFORMED DURING STAY: right intramedullary right femur on 6/7/2020 

PM





ADMITTING DIAGNOSES: 


1.Right fracture neck femur


2. L3 inferior plate fracture as per CT on 06/01/20 new since 9/5/19


3. Leucocytosis


4. PAD s/p bilateral leg bypass--bilateral illiac occlusion


5. A fib s/p ablation/Pacemaker


6. Hypertension


7. Hyperlipidemia


8. CAD


9. DM


10. Neuropathy/ Radiculopathy/Spinal canal stenosis/recurrent falls


11. hypothyroid


12. Vertigo/labrynthitis


13. GERD/ hiatal hernia


14. Urge incontinence


15. CKD


16. Anxiety








DISCHARGE DIAGNOSES:


1. Right subtrochanteric femur fracture 2/2 mechanical fall, s/p right 

intramedullary right femur repair


2. L3 inferior plate fracture 2/2 mechanical fall


3. Mechanical fall


3. Leucocytosis, reactive demargination and also due to steroids, improved


4. PAD s/p bilateral leg bypass-bilateral illiac occlusion


5. Chronic A fib s/p ablation/Pacemaker


6. Hypertension


7. Hyperlipidemia


8. CAD


9. DM, not on home med


10. Neuropathy/ Radiculopathy/Spinal canal stenosis/recurrent falls


11. hypothyroid


12. Vertigo/labrynthitis


13. GERD/ hiatal hernia


14. Urge incontinence


15. CKD, GFR initially 45, now 57, improving


16. Anxiety








COMPLICATIONS/CHIEF COMPLAINT: Rt Femoral Neck Fx.





HISTORY OF PRESENT ILLNESS: 85 year old female who was admitted initially to 

[facility] rehab on 06/01/20 after a fall in her senior living apartment; 

she fell forward toward right while trying to pull a rug which was stuck below 

the chair leg and was found to have L3 vertebral inferior end plate fracture. 

She was getting pain control and rehab at [facility] rehab, and in the 

morning of this hospital admission/transfer to [facility], pt another fall in the 

shower at the rehab unit.  Pt slipped on wet floor and fell on her right hip and

heard a snap. 





HOSPITAL COURSE: Xray of the pelvis there showed a right femoral neck fracture. 

She was also noted to have a L3 inferior plate fracture. 


Pt's eliquis was on held for 48 hours, she then underwent right intramedullary 

right femur on 6/7/2020 PM. On the day of discharge, pt reported no fever, 

chills, chest pain, palpitation, dyspnea. Denies any hematoma that she had 

noticed. Reported no BM since admission. Reported pain in right dorsal foot 

region around midfoot since the fall but report no pain in the other region of 

the foot. Right foot X ray ordered 6/8/2020 morning with formal report pending. 





DISCHARGE MEDICATIONS: Please see below.


 


ALLERGIES: Please see below.





PHYSICAL EXAMINATION ON DISCHARGE:


VITAL SIGNS: Please see below.


GENERAL: Alert and awake, in minimal acute distress, elderly


HEENT: Head normocephalic, atraumatic, mucosa moist


NECK: supple


CARDIOVASCULAR EXAMINATION: RRR, no murmur, normal S1 and S2


RESPIRATORY EXAMINATION: CTA b/l , no rales, wheezing, or rhonchi, mildly 

diminished at the base b/l, good air entry b/l


ABDOMINAL EXAMINATION: soft, bowel sound aus in all 4 quad, no guarding or 

distention


EXTREMITIES: Gauzes covering right lateral hip. Tenderness upon palpation in 

dorsal midfoot


SKIN: Normothermic. No obvious hematoma/ecchymosis noted. No swelling. 


NEUROLOGICAL EXAMINATION: A&OX3, memory and cognitive function grossly intact


PSYCHIATRIC EXAMINATION: appropriate to situation





LABORATORY DATA: Please see below.





IMAGING: 


CXR 6/5/2020 Mild cardiomegaly. Calcification and ectasia of the thoracic aorta.

Left dual-lead pacemaker


Right hip X ray 6/5/2020 fracture of the subtrochanteric region of the proximal 

right femur; severe medial angulation.Multiple metallic clips are seen overlying


the region of the hip.


Right femur X ray 6/5/2020 fracture of the proximal right femur in the 

subtrochanteric region with severe medial angulation


Right hip X ray 6/7/2020 ORIF in OR. Alignment is near anatomical. 








PROGNOSIS: [Good]





ACTIVITY: [As tolerated].





DIET: 2g Na diet








DISPOSITION: 62 D/T Rehab Facility.





DISCHARGE PLAN AND INSTRUCTIONS:


1. Acute rehab


2. F/u PCP and orthopedics after acute rehab


3. Take medication as prescribed





ITEMS TO FOLLOWUP ON ON OUTPATIENT:


1. Right femur fracture, s/p surgical repair


2. Eliquis dosing, pt's creatinine function is wnl with weight 72kg





DISCHARGE CONDITION: [Stable].





TIME SPENT ON DISCHARGE: Greater than [35] minutes.





Vital Signs/I&Os





Vital Signs








  Date Time  Temp Pulse Resp B/P (MAP) Pulse Ox O2 Delivery O2 Flow Rate FiO2


 


6/8/20 12:00 97.2 60 18 131/63 (85) 94 Nasal Cannula 2.0 














I&O- Last 24 Hours up to 6 AM 


 


 6/8/20





 06:00


 


Intake Total 1840 ml


 


Output Total 1050 ml


 


Balance 790 ml











Laboratory Data


Labs 24H


Laboratory Tests 2


6/8/20 05:24: 


Immature Granulocyte % (Auto) 0.5, Neutrophils (%) (Auto) 78.2H, Lymphocytes (%)

 (Auto) 6.2L, Monocytes (%) (Auto) 14.5H, Eosinophils (%) (Auto) 0.1, Basophils 

(%) (Auto) 0.5, Neutrophils # (Auto) 9.8H, Lymphocytes # (Auto) 0.8L, Monocytes 

# (Auto) 1.8H, Eosinophils # (Auto) 0.0, Basophils # (Auto) 0.1, Nucleated Red 

Blood Cells % (auto) 0.0, Anion Gap 7L, Glomerular Filtration Rate 57.4, Calcium

 Level 8.2L, Troponin I < 0.02


CBC/BMP


Laboratory Tests


6/8/20 05:24











Microbiology





Microbiology


6/6/20 Respiratory Virus Panel (PCR) (MAIKOL) - Final, Complete





Discharge Medications


Scheduled


Amlodipine Besylate (Amlodipine Besylate) 5 Mg Tablet, 5 MG PO BID, (Reported)


Apixaban (Eliquis) 5 Mg Tablet, 5 MG PO BID


   Start at 6/8/2020 at 2000. 


Aspirin (Aspirin) 81 Mg Tab.chew, 81 MG PO DAILY, (Reported)


Atorvastatin Calcium (Atorvastatin Calcium) 20 Mg Tablet, 20 MG PO QHS, (Re

ported)


Buspirone HCl (Buspirone HCl) 5 Mg Tablet, 5 MG PO BID, (Reported)


Cholecalciferol (Vitamin D3) (Vitamin D-400) 10 Mcg Tablet, 400 UNITS PO DAILY, 

(Reported)


Docusate Sodium (Dok) 100 Mg Capsule, 100 MG PO DAILY, (Reported)


Furosemide (Furosemide) 40 Mg Tablet, 40 MG PO DAILY, (Reported)


Gabapentin (Gabapentin) 100 Mg Capsule, 100 MG PO BID, (Reported)


Levothyroxine Sodium (Levothyroxine Sodium) 75 Mcg Tablet, 75 MCG PO QAM, 

(Reported)


Magnesium Oxide (Magnesium Oxide) 400 Mg Tablet, 400 MG PO DAILY, (Reported)


Meclizine HCl (Meclizine HCl) 25 Mg Tablet, 25 MG PO TID, (Reported)


Metoprolol Tartrate (Metoprolol Tartrate) 50 Mg Tablet, 50 MG PO BID, (Reported)


Multivitamin with Folic Acid (Thera Tablet) 400 Mcg Tablet, 1 TAB PO DAILY, 

(Reported)


Oxybutynin Chloride (Oxybutynin Chloride) 5 Mg Tablet, 5 MG PO BID, (Reported)


Pantoprazole Sodium (Pantoprazole Sodium) 40 Mg Tablet.dr, 40 MG PO DAILY, 

(Reported)


Potassium Chloride (Potassium Chloride) 20 Meq Tab.er.prt, 20 MEQ PO BID, 

(Reported)


Telmisartan (Telmisartan) 20 Mg Tablet, 20 MG PO QHS, (Reported)





Scheduled PRN


Ondansetron (Ondansetron Odt) 4 Mg Tab.rapdis, 4 MG PO Q6HP PRN for NAUSEA OR 

VOMITING


Oxycodone/Acetaminophen (Oxycodone-Acetaminophen 5-325) 1 Each Tablet, 2 TAB PO 

Q4H PRN for MILD/MODERATE PAIN (PS 1-7)


Polyethylene Glycol 3350 (Polyethylene Glycol 3350) 17 Gm Powd.pack, 1 PKT PO D

AILYPRN PRN for CONSTIPATION


Sennosides/Docusate Sodium (Senna Plus Tablet) 1 Each Tablet, 1 TAB PO BID PRN 

for CONSTIPATION





Allergies


Coded Allergies:  


     Penicillins (Unverified  Allergy, Unknown, 6/5/20)


     latex (Unverified  Allergy, Unknown, 6/5/20)


     morphine (Unverified  Allergy, Unknown, 6/7/20)


   


   CONFIRMED DOSES ON 6/5 NO REACTION





GME ATTESTATION


GME ATTESTATION


My faculty preceptor for this patient encounter was physically present during 

the encounter and was fully available. All aspects of the patient interview, 

examination, medical decision making process, and medical care plan development 

were reviewed and approved by the faculty preceptor. The faculty preceptor is 

aware and concurs with the plan as stated in the body of this note and will 

attest to such by his/her cosignature.











DARNELL VACA DO                  Jun 8, 2020 15:12

## 2020-06-08 NOTE — IPNPDOC
Subjective


Date Seen


The patient was seen on 6/8/20.





Subjective


Chief Complaint/HPI


Pt was examined at bedside. She reported no fever, chills, chest pain, 

palpitation, dyspnea. Denies any hematoma that she had noticed. Reported no BM 

since admission. Reported pain in right dorsal foot region around midfoot since 

the fall but report no pain in the other region of the foot.


General:  Denies: Chills


Constitutional:  Denies: Chills, Fever


Pulmonary:  Denies: Dyspnea


Cardiovascular:  Denies: Chest Pain, Palpitations


Gastrointestinal:  Reports: Constipation; 


   Denies: Abdominal Pain


Musculoskeletal:  Reports: Foot Pain (right dorsal foot pain)





Objective


Physical Examination


General Exam:  Positive: Alert, Cooperative, No Acute Distress


Eye Exam:  Positive: PERRLA, Conjunctiva & lids normal; 


   Negative: Sclera icteric


ENT Exam:  Positive: Atraumatic, Mucous membr. moist/pink


Neck Exam:  Positive: Other (well healed scar noted on left side of neck); 


   Negative: JVD, thyromegaly


Chest Exam:  Positive: Clear to auscultation, Normal air movement


Heart Exam:  Positive: Rate Normal, Regular Rhythm, Normal S1, Normal S2; 


   Negative: Murmurs, Rubs


Abdomen Exam:  Positive: Normal bowel sounds, Soft; 


   Negative: Tenderness


Extremity Exam:  Positive: Normal pulses, Tenderness (in right midfoot dorsal 

region, no tenderness in base of 5th metatarsal, posterior edge of medial or 

lateral malleolus), Swelling (right lateral thigh); 


   Negative: Clubbing, Cyanosis, Edema


Skin Exam:  Positive: Nl turgor and temperature, Other skin issue (no 

ecchymosis, pertechiae, or hematoma noted. Gauzes on right lateral thigh; 

swelling on lateral thigh); 


   Negative: Breakdown, Lesion


Neuro Exam:  Positive: Normal Speech, Normal Tone


Psych Exam:  Positive: Mental status NL, Mood NL, Anxiety, Memory Intact, 

Oriented x 3





Assessment /Plan


Assessment


85 year old female who was admitted [facility] on 

06/01/20 after a fall in her senior living apartment; she fell forward toward 

right while trying to pull a rug which was stuck below the chair leg and was 

found to have L3 vertebral inferior end plate fracture. She was getting pain 

control and rehab at [facility], and in the morning of this hospital

admission/transfer to [facility], pt another fall in the shower at the rehab unit.  Pt 

slipped on wet floor and fell on her right hip and heard a snap. Xray of the 

pelvis there showed a right femoral neck fracture. S/p right intramedullary 

right femur on 6/7/2020 PM





1. Right subtrochanteric femur fracture 2/2 mechanical fall, s/p right 

intramedullary right femur on 6/7/2020 PM. Eliquis was held for 48 hrs prior to 

surgery. Cont Pain control with narcotics, zofran PRN. 


COVID test neg. PT&OT eval; ARU screen. Ortho Dr Bland. 





2. L3 inferior plate fracture 2/2 mechanical fall, shown on CT on 06/01/20, new 

compared to 9/5/19. Cont pain control with oxycodone, morphine tramadol





3. Mechanical fall. Pt was walking without a walker, denied any vertigo, 

dizziness or lightheadedness prior to most recent fall. Denies any other symptom

besides extremity pain and RLE numbness after fall. Pt reported right dorsal 

midfoot pain that presented after the most recent fall; consider right foot X 

ray per [place] rule





3. Leucocytosis, reactive demargination and also due to steroids, improved. pa

sarahi was getting methyl prednisone at the other hospital. 





4. PAD s/p bilateral leg bypass-bilateral illiac occlusion. Carotid artery dis 

S/p left carotid endarterectomy. Cont ASA and statin





5. Chronic A fib s/p ablation/Pacemaker. Hx of A. fib on eliquis at home, held 

for 48 hrs for orthopedic procedure. Cont metoprolol, vitals stable. Plan resume

24hrs after procedure on 6/8/2020 around 8PM; confirmed with Dr. Bland





6. Hypertension. Cont home med metoprolol, amlodipine,valsartan





7. Hyperlipidemia. Cont home med  statin





9. CAD. Cont chloe emed ASA, statin, and metoprolol





10. DM, not on home med. Patient reported borderline not on any eds. sugars 

roughly stable





11. Neuropathy/ Radiculopathy/Spinal canal stenosis/recurrent falls. Cont home 

med gabapentin





12. hypothyroid. Cont home med synthroid





13. Vertigo/labrynthitis. PMH of vertigo, cont home med meclizine prn





14. GERD/ hiatal hernia. Cont PPI





15. Urge incontinence. Resume home med oxybutynin. 





16. Questionable CKD, GFR initially 45, now 57, improving. No prior to compare. 

Cont to trend BMP





17. Anxiety. Cont home med buspirone





Plan/VTE


VTE Prophylaxis Ordered?:  Yes





Plan


Diet:  Continue Current


Therapy:  PT, OT


Diagnostics:  Repeat Labs in AM





VS, I&O, 24H, Fishbone


Vital Signs/I&O





Vital Signs








  Date Time  Temp Pulse Resp B/P (MAP) Pulse Ox O2 Delivery O2 Flow Rate FiO2


 


6/8/20 08:06   18   Nasal Cannula 2.0 


 


6/8/20 08:05  60  136/64    


 


6/8/20 08:00 97.5    97   














I&O- Last 24 Hours up to 6 AM 


 


 6/8/20





 06:00


 


Intake Total 1840 ml


 


Output Total 1050 ml


 


Balance 790 ml











Laboratory Data


24H LABS


Laboratory Tests 2


6/8/20 05:24: 


Immature Granulocyte % (Auto) 0.5, Neutrophils (%) (Auto) 78.2H, Lymphocytes (%)

(Auto) 6.2L, Monocytes (%) (Auto) 14.5H, Eosinophils (%) (Auto) 0.1, Basophils 

(%) (Auto) 0.5, Neutrophils # (Auto) 9.8H, Lymphocytes # (Auto) 0.8L, Monocytes 

# (Auto) 1.8H, Eosinophils # (Auto) 0.0, Basophils # (Auto) 0.1, Nucleated Red 

Blood Cells % (auto) 0.0, Anion Gap 7L, Glomerular Filtration Rate 57.4, Calcium

Level 8.2L, Troponin I < 0.02


CBC/BMP


Laboratory Tests


6/8/20 05:24








Microbiology





Microbiology


6/6/20 Respiratory Virus Panel (PCR) (MAIKOL) - Final, Complete











DARNELL VACA DO                  Jun 8, 2020 10:17

## 2020-06-08 NOTE — REP
REASON:  Pain after fall.

 

The technologist has made note in the [employer] that the images

obtained were the best obtainable.

 

The bones are demineralized. Mild degenerative changes seen throughout the foot.

Only three views were obtained. There is no gross fracture. A small fracture

cannot be ruled out. Due to exam limitations.

 

 

Electronically Signed by

Roque Sharp DO 06/08/2020 07:11 P

## 2020-06-08 NOTE — REP
RIGHT FEMUR:

 

AP and lateral views of right femur are performed.

 

There is a fracture of the proximal right femur in the subtrochanteric region

with severe medial angulation. No other fracture or dislocation is seen.

 

 

Electronically Signed by

Nii Guzmán MD 06/08/2020 10:56 P

## 2020-06-08 NOTE — REP
RIGHT HIP, THREE VIEWS:

 

Three views of the right hip are performed. There is a fracture of the

subtrochanteric region of the proximal right femur. There is severe medial

angulation. There is no dislocation. Multiple metallic clips are seen overlying

the region of the hip.

 

 

Electronically Signed by

Nii Guzmán MD 06/08/2020 10:56 P

## 2020-06-08 NOTE — ECGEPIP
[facility]

                                       

                                       Test Date:    2020

Pat Name:     KIARA SANCHEZ         Department:   

Patient ID:   [id number]                 Room:         [place] [address]

Gender:       Female                   Technician:   PHI CORREA

:          1935               Requested By: KHUBAIB GONDAL N.

Order Number: [id number]     Reading MD:   Washington Prasad

                                 Measurements

Intervals                              Axis          

Rate:         65                       P:            -89

CA:           254                      QRS:          12

QRSD:         97                       T:            -88

QT:           440                                    

QTc:          458                                    

                           Interpretive Statements

ELECTRONIC ATRIAL PACEMAKER

LOW QRS VOLTAGE IN PRECORDIAL LEADS

ST DEVIATION AND MODERATE T-WAVE ABNORMALITY, CONSIDER ANTEROLATERAL ISCHEMIA

ST DEVIATION AND MODERATE T-WAVE ABNORMALITY, CONSIDER INFERIOR ISCHEMIA

Similar to tracing done 20

Electronically Signed on 2020 11:57:50 EDT by Washington Prasad

## 2020-06-08 NOTE — REP
CHEST, SINGLE VIEW:

 

Single view of the chest is performed. No acute infiltrate is seen. There is mild

cardiomegaly. There is calcification and ectasia of the thoracic aorta. Left

dual-lead pacemaker is noted.

 

 

Electronically Signed by

Nii Guzmán MD 06/08/2020 10:56 P

## 2020-06-09 VITALS — SYSTOLIC BLOOD PRESSURE: 162 MMHG | DIASTOLIC BLOOD PRESSURE: 78 MMHG

## 2020-06-09 VITALS — DIASTOLIC BLOOD PRESSURE: 60 MMHG | SYSTOLIC BLOOD PRESSURE: 180 MMHG

## 2020-06-09 VITALS — SYSTOLIC BLOOD PRESSURE: 135 MMHG | DIASTOLIC BLOOD PRESSURE: 63 MMHG

## 2020-06-09 VITALS — SYSTOLIC BLOOD PRESSURE: 137 MMHG | DIASTOLIC BLOOD PRESSURE: 61 MMHG

## 2020-06-09 VITALS — SYSTOLIC BLOOD PRESSURE: 142 MMHG | DIASTOLIC BLOOD PRESSURE: 67 MMHG

## 2020-06-09 VITALS — DIASTOLIC BLOOD PRESSURE: 65 MMHG | SYSTOLIC BLOOD PRESSURE: 142 MMHG

## 2020-06-09 VITALS — SYSTOLIC BLOOD PRESSURE: 141 MMHG | DIASTOLIC BLOOD PRESSURE: 63 MMHG

## 2020-06-09 VITALS — DIASTOLIC BLOOD PRESSURE: 90 MMHG | SYSTOLIC BLOOD PRESSURE: 149 MMHG

## 2020-06-09 LAB
ALBUMIN SERPL BCG-MCNC: 2 GM/DL (ref 3.2–5.2)
ALT SERPL W P-5'-P-CCNC: 24 U/L (ref 12–78)
BASOPHILS # BLD AUTO: 0.1 10^3/UL (ref 0–0.2)
BASOPHILS NFR BLD AUTO: 1.3 % (ref 0–1)
BILIRUB SERPL-MCNC: 0.4 MG/DL (ref 0.2–1)
BUN SERPL-MCNC: 33 MG/DL (ref 7–18)
CALCIUM SERPL-MCNC: 8.2 MG/DL (ref 8.8–10.2)
CHLORIDE SERPL-SCNC: 101 MEQ/L (ref 98–107)
CO2 SERPL-SCNC: 29 MEQ/L (ref 21–32)
CREAT SERPL-MCNC: 1.34 MG/DL (ref 0.55–1.3)
EOSINOPHIL # BLD AUTO: 1 10^3/UL (ref 0–0.5)
EOSINOPHIL NFR BLD AUTO: 9.4 % (ref 0–3)
GFR SERPL CREATININE-BSD FRML MDRD: 40 ML/MIN/{1.73_M2} (ref 32–?)
GLUCOSE SERPL-MCNC: 87 MG/DL (ref 70–100)
HCT VFR BLD AUTO: 25.4 % (ref 36–47)
HGB BLD-MCNC: 8.3 G/DL (ref 12–15.5)
LYMPHOCYTES # BLD AUTO: 1.8 10^3/UL (ref 1.5–5)
LYMPHOCYTES NFR BLD AUTO: 17.3 % (ref 24–44)
MCH RBC QN AUTO: 28 PG (ref 27–33)
MCHC RBC AUTO-ENTMCNC: 32.7 G/DL (ref 32–36.5)
MCV RBC AUTO: 85.8 FL (ref 80–96)
MONOCYTES # BLD AUTO: 1.7 10^3/UL (ref 0–0.8)
MONOCYTES NFR BLD AUTO: 15.9 % (ref 0–5)
NEUTROPHILS # BLD AUTO: 5.8 10^3/UL (ref 1.5–8.5)
NEUTROPHILS NFR BLD AUTO: 55.4 % (ref 36–66)
PLATELET # BLD AUTO: 257 10^3/UL (ref 150–450)
POTASSIUM SERPL-SCNC: 4.3 MEQ/L (ref 3.5–5.1)
PROT SERPL-MCNC: 5.4 GM/DL (ref 6.4–8.2)
RBC # BLD AUTO: 2.96 10^6/UL (ref 4–5.4)
SODIUM SERPL-SCNC: 133 MEQ/L (ref 136–145)
WBC # BLD AUTO: 10.5 10^3/UL (ref 4–10)

## 2020-06-09 PROCEDURE — 30233N1 TRANSFUSION OF NONAUTOLOGOUS RED BLOOD CELLS INTO PERIPHERAL VEIN, PERCUTANEOUS APPROACH: ICD-10-PCS | Performed by: PHYSICAL MEDICINE & REHABILITATION

## 2020-06-09 RX ADMIN — ACETAMINOPHEN SCH MG: 500 TABLET ORAL at 13:20

## 2020-06-09 RX ADMIN — ACETAMINOPHEN SCH MG: 500 TABLET ORAL at 21:00

## 2020-06-09 RX ADMIN — PANTOPRAZOLE SODIUM SCH MG: 40 TABLET, DELAYED RELEASE ORAL at 08:22

## 2020-06-09 RX ADMIN — WHITE PETROLATUM SCH DOSE: 57; 17 PASTE TOPICAL at 08:23

## 2020-06-09 RX ADMIN — METOPROLOL TARTRATE SCH MG: 50 TABLET, FILM COATED ORAL at 21:00

## 2020-06-09 RX ADMIN — MECLIZINE HYDROCHLORIDE SCH MG: 25 TABLET ORAL at 15:44

## 2020-06-09 RX ADMIN — METOPROLOL TARTRATE SCH MG: 50 TABLET, FILM COATED ORAL at 08:23

## 2020-06-09 RX ADMIN — WHITE PETROLATUM SCH DOSE: 57; 17 PASTE TOPICAL at 15:44

## 2020-06-09 RX ADMIN — MECLIZINE HYDROCHLORIDE SCH MG: 25 TABLET ORAL at 21:01

## 2020-06-09 RX ADMIN — DOCUSATE SODIUM,SENNOSIDES SCH TAB: 50; 8.6 TABLET, FILM COATED ORAL at 21:01

## 2020-06-09 RX ADMIN — GABAPENTIN SCH MG: 100 CAPSULE ORAL at 21:01

## 2020-06-09 RX ADMIN — LEVOTHYROXINE SODIUM SCH MCG: 75 TABLET ORAL at 05:58

## 2020-06-09 RX ADMIN — ATORVASTATIN CALCIUM SCH MG: 20 TABLET, FILM COATED ORAL at 21:00

## 2020-06-09 RX ADMIN — APIXABAN SCH MG: 2.5 TABLET, FILM COATED ORAL at 08:22

## 2020-06-09 RX ADMIN — APIXABAN SCH MG: 2.5 TABLET, FILM COATED ORAL at 21:00

## 2020-06-09 RX ADMIN — DOCUSATE SODIUM,SENNOSIDES SCH TAB: 50; 8.6 TABLET, FILM COATED ORAL at 08:22

## 2020-06-09 RX ADMIN — GABAPENTIN SCH MG: 100 CAPSULE ORAL at 08:22

## 2020-06-09 RX ADMIN — ACETAMINOPHEN SCH MG: 500 TABLET ORAL at 05:59

## 2020-06-09 RX ADMIN — FERROUS SULFATE TAB 325 MG (65 MG ELEMENTAL FE) SCH MG: 325 (65 FE) TAB at 21:01

## 2020-06-09 RX ADMIN — WHITE PETROLATUM SCH DOSE: 57; 17 PASTE TOPICAL at 21:01

## 2020-06-09 RX ADMIN — MAGNESIUM OXIDE SCH MG: 400 TABLET ORAL at 08:22

## 2020-06-09 RX ADMIN — MECLIZINE HYDROCHLORIDE SCH MG: 25 TABLET ORAL at 08:22

## 2020-06-09 NOTE — REP
DEEP VENOUS ULTRASONOGRAPHY BILATERAL THIGHS, RULE OUT DVT:

 

REASON FOR EXAM:  Immobility with pain and swelling.

 

TECHNIQUE:  Multiple ultrasonographic images of the deep venous structures of the

thigh were obtained from the common femoral vein to the popliteal vein along with

Doppler interrogation and color flow Doppler images.

 

FINDINGS:  There is no abnormal echogenic material seen within any of the

visualized deep venous structures that would suggest acute thrombosis.

Coaptation is unremarkable throughout.  Doppler interrogation shows an expected

response to respiratory variability and augmentation.  The color flow images show

what appears to be a normal vascular pattern throughout.

 

IMPRESSION:

 

There is no ultrasonographic evidence of deep venous thrombosis involving any of

the visualized deep venous structures of the bilateral thighs, as described

above.

 

 

Electronically Signed by

Roque Sharp DO 06/09/2020 11:12 A

## 2020-06-09 NOTE — HPEPDOC
Physiatrist Note


DATE OF ADMISSION: 6-8-20





DATE OF SERVICE: 6-8-20





TIME OF ADMISSION: Please refer to physician's admission order.





SOURCE OF ADMISSION INFORMATION: [facility] record and patient





CHIEF COMPLAINT: hip fracture





HISTORY OF PRESENT ILLNESS: 


85F pmh recurrent falls, A fib s/p ablation with PM, CAD, HTn, HLD, DM, CKD, 

lumbar stenosis, PAD s/p bilateral iliac bypasses, carotid artery stenosis s/p 

left sided CEA, vertigo, L3 inferior plate fracture who fell at home and 

presented to [facility] ED on 6-5-20 complaining of difficulty walking. Hip x-rasy 

showed, fracture of the subtrochanteric region of the proximal right femur. 

She was evaluated by therapy, her Eliquis dosing held, and she underwent a right

hip ORIF on 6-7-20 without complication and made de WBAT. She had leukocytosis 

that trended down and post-op anemia and required supplemental oxygen. She was 

evaluated by therapy, found to have impairments in mobility and ADLs and deemed 

medically appropriate for discharge to ARU on 6-8-20. 








REVIEW OF SYSTEMS: The following is a completed review of systems and has been 

reviewed. Review of systems otherwise unremarkable.


PAIN: Patient self reports right hip pain


EYES: No recent vision changes


EARS, NOSE, & THROAT:+intermittent dysphagia solids and liquids (chronic)


CARDIOVASCULAR: Denies chest pain or palpitations


PULMONARY: Denies shortness of breath


GASTROINTESTINAL: Denies constipation/diarrhea


GENITOURINARY:denies dysuria


MUSCULOSKELETAL: right hip fracture


NEUROLOGICAL:denies paresthesias


HEMATOLOGICAL: denies easy bruising, +anemia


SKIN:right hip incision


PSYCHIATRIC: Unremarkable


All other review of systems found to be negative.





PAST MEDICAL HISTORY:


as per hPI





PAST SURGICAL HISTORY:


As per HPI and cholecystectomy, hysterectomy, cervical neck surgery 





ALLERGIES: Please see below.





MEDICATIONS: Please see below.





FAMILY HISTORY: DM, cardiac, cancer





SOCIAL HISTORY: no etoh/illicit drugs/smoking





DIET: low sodium





PHYSICAL EXAMINATION:


VITAL SIGNS: Please see below.


GENERAL: Pleasant and cooperative. No acute distress. 


HEENT: PERRL. Extraocular movements intact. Clear conjunctiva


CARDIOVASCULAR: Regular rate and rhythm. No murmurs, rubs, or gallops


LUNGS: Clear to auscultation bilaterally. No wheezes. No rhonchi


ABDOMEN: Soft, nontender, nondistended. Positive bowel sounds. Normal active 

bowel sounds


NEUROLOGICAL: Alert and oriented times three. Cranial nerves II through XII 

grossly intact. Sensation grossly intact


EXTREMITIES: 5\5 strength bilateral upper extremities. 4+\5 strength right ankle

DF/EHL and PF (limited due to surgery) 4+/5 strength in left lower extremity. 





SKIN: right hip incision, blanchable sacral erythema





LABORATORY DATA: Please see below.





IMAGING:Imaging documentation personally reviewed by record





FUNCTIONAL STATUS: 


Premorbid: Modified Independent with all activities of daily life as well as m

obility with 4W RW


On Admission: Mod-assist for functional transfers, bed mobility, and ambulation 

with a RW





GOALS: Mod-I household distances for ambulation, functional transfers, stairs, 

dressing, bathing, toileting





ASSESSMENT:85-year-old F with past medical history of Afib who presents status 

post fall with right femur fracture





PLAN:


1. PT/OT- advance gait and ADLs, strengthen/stretch/maintain ROM all4 limbs


-SLP- eval for swallow, patient endorsing intermittent dysphagia 


2. Ortho- s/p right femur fracture and ORIF, ortho consulted- WBAT


-right medial foot pain reported on inpatient with follow-up Xray negative for 

gross fracture, however due to bone demineralization a small fracture was unable

to be ruled out, will discuss need for further imaging with ortho 


3. Cardiac: Afib s/p ablation on eliquis and metoprolol


-HTN c/u amlodipine and Micardis-medicine consulted to assist in overall 

management


- CAD c/u ASA


-HLD c/u statin


4. Resp: c/u supplemental 02, encourgae incentive spirometry


5. Neuro: chronic vertigo c/u meclizine


6. Renal- hx of CKD monitor for ALVERTO


7. Heme: post-op anemia, monitor and transfuse if <8 or symptomatic


8. DVT ppx: c/u eliquis, will order admission Doppler to r/o DVT


9. GI ppx: Protonix


10. : hx of incontinence, c/u Ditropan


11. Pain: Tylenol, oxycodone, and gabapentin 


12. Endo: hypothyroidism- c/u synthroid


13. Dispo: TBD








POST ADMISSION PHYSICIAN EVALUATION: 


Medical and functional status: Description of medical status, medical 

assessment: As above. Rehabilitation diagnosis and current and prior cold morbid

medical conditions as above. Risk of complications and plans to mitigate them as

above. Description of functional status current status is as above. Prior status

as above.


Status compared to preadmission: There are no clinically significant differences

between the patient's current status and the information described on the 

preadmission screening document.


Treatment plan anticipated: Treatment plan is as described above. Required 

disciplines including physical therapy, occupational therapy, others as noted 

above.


Intensity of services:  3 hours a day, 6 days a week. 


Special considerations: There are no specific special or safety considerations 

that would likely preclude immediate implementation of an intensive rehabilita

tion program or subsequently influence the plan of care.





ATTESTATION: Considering all the information above, it is my best judgment that 

this patient requires intensive rehabilitation therapy as described above and an

inpatient hospital environment due to the complexity of nursing, medical, and 

rehabilitation needs required by the patient. Furthermore, this patient can 

reasonably be expected to participate in an benefit from an inpatient 

rehabilitation stay with an interdisciplinary team approach to the delivery of 

rehabilitation care under the direction and supervision of rehabilitation phys

ician.





PROGNOSIS: Excellent





ESTIMATED LENGTH OF STAY:12-14 days.





PROJECTED DISCHARGE DESTINATION: Home with family support and any durable 

medical equipment required to increase functional safety and mobility.





TIME SPENT COUNSELING AND COORDINATING INITIAL CARE: Greater than 70 minutes.


Vital Signs





Vital Sign - Last 24 Hours








 6/8/20 6/8/20 6/8/20 6/8/20





 14:40 20:00 21:00 21:06


 


Temp 97.9 97.4  


 


Pulse 60 61 61 61


 


Resp 17 16  


 


B/P (MAP) 107/53 (71) 116/61 (79) 116/61 116/61


 


Pulse Ox 96 98  


 


O2 Delivery Room Air Room Air  


 


    





 6/9/20 6/9/20 6/9/20 6/9/20





 06:00 08:23 08:23 09:07


 


Temp 97.8   


 


Pulse 60 62  


 


Resp 17  19 19


 


B/P (MAP) 180/60 (100) 127/62  


 


Pulse Ox 98   


 


O2 Delivery Room Air   











Laboratory Data


CBC/BMP


Laboratory Tests


6/9/20 07:16








Labs 24H


Laboratory Tests 2


6/9/20 07:16: 


Immature Granulocyte % (Auto) 0.7, Neutrophils (%) (Auto) 55.4, Lymphocytes (%) 

(Auto) 17.3L, Monocytes (%) (Auto) 15.9H, Eosinophils (%) (Auto) 9.4H, Basophils

(%) (Auto) 1.3H, Neutrophils # (Auto) 5.8, Lymphocytes # (Auto) 1.8, Monocytes #

(Auto) 1.7H, Eosinophils # (Auto) 1.0H, Basophils # (Auto) 0.1, Nucleated Red 

Blood Cells % (auto) 0.0, Anion Gap 3L, Glomerular Filtration Rate 40.0, Calcium

Level 8.2L, Total Bilirubin 0.4, Aspartate Amino Transf (AST/SGOT) 25, Alanine 

Aminotransferase (ALT/SGPT) 24, Alkaline Phosphatase 74, Total Protein 5.4L, 

Albumin 2.0L, Albumin/Globulin Ratio 0.6L





Home Medications


Scheduled


Amlodipine Besylate (Amlodipine Besylate) 5 Mg Tablet, 5 MG PO BID, (Reported)


Apixaban (Eliquis) 5 Mg Tablet, 5 MG PO BID


   Start at 6/8/2020 at 2000. 


Aspirin (Aspirin) 81 Mg Tab.chew, 81 MG PO DAILY, (Reported)


Atorvastatin Calcium (Atorvastatin Calcium) 20 Mg Tablet, 20 MG PO QHS, (Repo

rted)


Buspirone HCl (Buspirone HCl) 5 Mg Tablet, 5 MG PO BID, (Reported)


Cholecalciferol (Vitamin D3) (Vitamin D-400) 10 Mcg Tablet, 400 UNITS PO DAILY, 

(Reported)


Docusate Sodium (Dok) 100 Mg Capsule, 100 MG PO DAILY, (Reported)


Furosemide (Furosemide) 40 Mg Tablet, 40 MG PO DAILY, (Reported)


Gabapentin (Gabapentin) 100 Mg Capsule, 100 MG PO BID, (Reported)


Levothyroxine Sodium (Levothyroxine Sodium) 75 Mcg Tablet, 75 MCG PO QAM, 

(Reported)


Magnesium Oxide (Magnesium Oxide) 400 Mg Tablet, 400 MG PO DAILY, (Reported)


Meclizine HCl (Meclizine HCl) 25 Mg Tablet, 25 MG PO TID, (Reported)


Metoprolol Tartrate (Metoprolol Tartrate) 50 Mg Tablet, 50 MG PO BID, (Reported)


Multivitamin with Folic Acid (Thera Tablet) 400 Mcg Tablet, 1 TAB PO DAILY, 

(Reported)


Oxybutynin Chloride (Oxybutynin Chloride) 5 Mg Tablet, 5 MG PO BID, (Reported)


Pantoprazole Sodium (Pantoprazole Sodium) 40 Mg Tablet.dr, 40 MG PO DAILY, (R

eported)


Potassium Chloride (Potassium Chloride) 20 Meq Tab.er.prt, 20 MEQ PO BID, 

(Reported)


Telmisartan (Telmisartan) 20 Mg Tablet, 20 MG PO QHS, (Reported)





Scheduled PRN


Ondansetron (Ondansetron Odt) 4 Mg Tab.rapdis, 4 MG PO Q6HP PRN for NAUSEA OR 

VOMITING


Oxycodone/Acetaminophen (Oxycodone-Acetaminophen 5-325) 1 Each Tablet, 2 TAB PO 

Q4H PRN for MILD/MODERATE PAIN (PS 1-7)


Polyethylene Glycol 3350 (Polyethylene Glycol 3350) 17 Gm Powd.pack, 1 PKT PO 

DAILYPRN PRN for CONSTIPATION


Sennosides/Docusate Sodium (Senna Plus Tablet) 1 Each Tablet, 1 TAB PO BID PRN 

for CONSTIPATION





Allergies


Coded Allergies:  


     Penicillins (Unverified  Allergy, Unknown, 6/5/20)


     latex (Unverified  Allergy, Unknown, 6/5/20)


     morphine (Unverified  Allergy, Unknown, 6/7/20)


   


   CONFIRMED DOSES ON 6/5 NO REACTION





A-FIB/CHADSVASC


A-FIB History


Current/History of A-Fib/PAF?:  Yes


Current PO Anticoag Therapy:  Yes











FARAZ SHIN MD          Jun 9, 2020 12:49

## 2020-06-09 NOTE — IPNPDOC
PM&R Progress Note


DATE OF SERVICE:  Jun 9, 2020


Physiatrist Progress Note


Subjective:


Patient reporting she is worn out by therapy and agreeable to getting a blood 

transfusion.








REVIEW OF SYSTEMS: The following is a completed review of systems and has been 

reviewed. Review of systems otherwise unremarkable.


PAIN: Patient self reports right hip pain


EYES: No recent vision changes


EARS, NOSE, & THROAT:+intermittent dysphagia solids and liquids (chronic)


CARDIOVASCULAR: Denies chest pain or palpitations


PULMONARY: Denies shortness of breath


GASTROINTESTINAL: Denies constipation/diarrhea


GENITOURINARY:denies dysuria


MUSCULOSKELETAL: right hip fracture


NEUROLOGICAL:denies paresthesias


HEMATOLOGICAL: denies easy bruising, +anemia


SKIN:right hip incision


PSYCHIATRIC: Unremarkable


All other review of systems found to be negative.








PHYSICAL EXAMINATION:


VITAL SIGNS: Please see below.


GENERAL: Pleasant and cooperative. No acute distress. 


HEENT: PERRL. Extraocular movements intact. Clear conjunctiva


CARDIOVASCULAR: Regular rate and rhythm. No murmurs, rubs, or gallops


LUNGS: Clear to auscultation bilaterally. No wheezes. No rhonchi


ABDOMEN: Soft, nontender, nondistended. Positive bowel sounds. Normal active 

bowel sounds


NEUROLOGICAL: Alert and oriented times three. Cranial nerves II through XII 

grossly intact. Sensation grossly intact


EXTREMITIES: 5\5 strength bilateral upper extremities. 4+\5 strength right ankle

DF/EHL and PF (limited due to surgery) 4+/5 strength in left lower extremity. 





SKIN: right hip incision, blanchable sacral erythema











ASSESSMENT:85-year-old F with past medical history of Afib who presents status 

post fall with right femur fracture





PLAN:


1. PT/OT- advance gait and ADLs, strengthen/stretch/maintain ROM all4 limbs


-SLP- eval for swallow, patient endorsing intermittent dysphagia 


2. Ortho- s/p right femur fracture and ORIF, ortho consulted- WBAT


-right medial foot pain reported on inpatient with follow-up Xray negative for 

gross fracture, however due to bone demineralization a small fracture was unable

to be ruled out, will discuss need for further imaging with ortho 


3. Cardiac: Afib s/p ablation on eliquis and metoprolol


-HTN c/u amlodipine,  Micardis on hold due to ALVERTO, medicine consulted to assist 

in overall management


- CAD c/u ASA


-HLD c/u statin


4. Resp: c/u supplemental 02, encourgae incentive spirometry


5. Neuro: chronic vertigo c/u meclizine


6. Renal- hx of CKD with new ALVERTO, holding ARB and will receive 1 unit rbc today 

to help with kidney perfusion 


7. Heme: post-op anemia, with Hgb today 8.2 in setting of ALVERTO, will give one 

unit rbc to assist with kidney function and symptomatic anemia


-will add iron


8. DVT ppx: c/u eliquis


-admission Doppler negative for DVT


9. GI ppx: Protonix


10. : hx of incontinence, now with retention, holding Ditropan and checking 

UA/Ucx


11. Pain: Tylenol, oxycodone, and gabapentin 


12. Endo: hypothyroidism- c/u synthroid


13. Dispo: TBD


Allergies


Coded Allergies:  


     Penicillins (Unverified  Allergy, Unknown, 6/5/20)


     latex (Unverified  Allergy, Unknown, 6/5/20)


     morphine (Unverified  Allergy, Unknown, 6/7/20)


   


   CONFIRMED DOSES ON 6/5 NO REACTION





Vital Signs





Vital Signs








  Date Time  Temp Pulse Resp B/P (MAP) Pulse Ox O2 Delivery O2 Flow Rate FiO2


 


6/9/20 14:00 97.0 60 18 142/65 (90) 98 Room Air  











Laboratory Data


CBC/BMP


Laboratory Tests


6/9/20 07:16








Labs 24H


Laboratory Tests 2


6/9/20 07:16: 


Immature Granulocyte % (Auto) 0.7, Neutrophils (%) (Auto) 55.4, Lymphocytes (%) 

(Auto) 17.3L, Monocytes (%) (Auto) 15.9H, Eosinophils (%) (Auto) 9.4H, Basophils

(%) (Auto) 1.3H, Neutrophils # (Auto) 5.8, Lymphocytes # (Auto) 1.8, Monocytes #

(Auto) 1.7H, Eosinophils # (Auto) 1.0H, Basophils # (Auto) 0.1, Nucleated Red 

Blood Cells % (auto) 0.0, Anion Gap 3L, Glomerular Filtration Rate 40.0, Calcium

Level 8.2L, Total Bilirubin 0.4, Aspartate Amino Transf (AST/SGOT) 25, Alanine 

Aminotransferase (ALT/SGPT) 24, Alkaline Phosphatase 74, Total Protein 5.4L, 

Albumin 2.0L, Albumin/Globulin Ratio 0.6L





Current Medications


Current Medications





Current Medications








 Medications


  (Trade)  Dose


 Ordered  Sig/Carlos


 Route


 PRN Reason  Start Time


 Stop Time Status Last Admin


Dose Admin


 


 Acetaminophen


  (Tylenol Tab)  1,000 mg  Q8H


 PO


   6/8/20 22:00


    6/9/20 13:20





 


 Amlodipine


 Besylate


  (Norvasc)  5 mg  BID


 PO


   6/8/20 21:00


    6/9/20 08:23





 


 Apixaban


  (Eliquis)  2.5 mg  BID


 PO


   6/8/20 21:00


    6/9/20 08:22





 


 Atorvastatin


 Calcium


  (Lipitor)  20 mg  QHS


 PO


   6/8/20 21:00


    6/8/20 21:05





 


 Bisacodyl


  (Dulcolax


 Suppository)  10 mg  DAILYPRN  PRN


 HI


 CONSTIPATION  6/8/20 14:45


     





 


 Buspirone HCl


  (Buspar)  5 mg  BID


 PO


   6/8/20 21:00


    6/9/20 08:21





 


 Gabapentin


  (Neurontin)  100 mg  BID


 PO


   6/8/20 21:00


    6/9/20 08:22





 


 Levothyroxine


 Sodium


  (Synthroid)  75 mcg  DAILY@06


 PO


   6/9/20 06:00


    6/9/20 05:58





 


 Magnesium Oxide


  (Mag-Ox)  400 mg  DAILY


 PO


   6/9/20 09:00


    6/9/20 08:22





 


 Meclizine HCl


  (Antivert)  25 mg  TID


 PO


   6/8/20 16:00


    6/9/20 08:22





 


 Metoprolol


 Tartrate


  (Lopressor)  50 mg  BID


 PO


   6/8/20 21:00


    6/9/20 08:23





 


 Ondansetron HCl


  (Zofran Odt)  4 mg  Q4HP  PRN


 PO


 NAUSEA OR VOMITING  6/8/20 14:45


     





 


 Oxybutynin


 Chloride


  (Ditropan)  5 mg  BID


 PO


   6/8/20 21:00


 6/9/20 13:50 DC 6/9/20 08:22





 


 Oxycodone HCl


  (Roxicodone,


 Oxyir)  5 mg  Q4HP  PRN


 PO


 PAIN  6/8/20 14:45


    6/9/20 08:23





 


 Pantoprazole


 Sodium


  (Protonix)  40 mg  DAILY


 PO


   6/9/20 09:00


    6/9/20 08:22





 


 Senna/Docusate


 Sodium


  (Senokot S)  2 tab  BID


 PO


   6/8/20 21:00


    6/9/20 08:22





 


 Telmisartan


  (Micardis)  20 mg  QHS


 PO


   6/8/20 21:00


 6/9/20 10:25 DC 6/8/20 21:06




















FARAZ SHIN MD          Jun 9, 2020 15:18

## 2020-06-09 NOTE — CR
DATE OF CONSULTATION:  06/09/2020

 

Consult for the acute rehab physician.

 

CHIEF COMPLAINT:  Bilateral foot pain.

 

HISTORY:  This is a pleasant 85-year-old female patient who underwent a right

intramedullary (IM) nail for a femur fracture by Dr. Bland.  During her

postoperative period, she complained of bilateral foot pain.  On talking with 
the

patient today, her foot pain has been ongoing since her stent placement.   She

notes the pain mainly along the plantar aspect of both feet.  It is worsened 
with

weightbearing activities and sometimes she gets a cramping sensation.   She

denies any true numbness or tingling sensation, mainly just that sensation in

that area in the bottom of both feet.  She has had x-rays.  I reviewed those

x-rays; I did not find an acute fracture.  There is very minimal early

degenerative changes on the mid foot region.  Otherwise she feels she is doing

well.  She is currently getting transfused for other conditions.  She notes her

hip feels better than it did before.

 

She denies any chest pain, shortness breath.  She was a little bit confused, but

she does relate a fairly good history to me.  Her health survey was reviewed and

updated in her medical records today.

 

PHYSICAL EXAM:  Today reveals an elderly female patient sitting comfortably in

her hospital stretcher.  She has blood dripping in slowly through her IV.  She 
is somewhat

confused but her mood and affect are appropriate for the situation.  She does

know why she is in the hospital, a little bit confused about what the date is.  
A

little confused on what year it is as well.

Exam of the right hip reveals a well-healing wound, it is well dressed without

signs of infection or compartment syndrome.  The thighs are soft, nontender to

palpation.

Exam of both feet does reveal mild edema dorsally but no erythema.  No signs of

infection, no increased warmth.  No pain with passive range of motion of the

great toes.  Dorsalis pedis, posterior tibial pulses are palpable. She is able 
to

appreciate light touch today.  There is brisk capillary refill at the exposed

toes.  No open wounds are noted.  No irritability with ankle range of motion.

Compartments of the foot and compartments of the leg are soft, nontender to

palpation on exam today.

 

IMPRESSION:  Chronic foot pain that has been ongoing for numerous years after

stent placement, as well as status post IM nail on the right side.

 

PLAN:  She can weightbear as tolerated on her lower extremities.  She can work

with therapy for gait training and other modalities that she is in therapy for.

She can continue to work with the rehab facility to work on strengthening

program.  We discussed ankle pumps, gentle ankle range of motion, curling her

toes, stretching out her lower extremities.  She could do that on her own.  The

therapist could also work with her for a stretching of the bilateral feet, 
mainly

working on the plantar fascial bands.  I think that would be helpful for her.

She can follow up with us at our office when she follows up for her hip 
fracture.

She does not need any further treatments.  No casting or booting required at 
this

point.  Again, weightbearing as tolerated on bilateral lower extremities as

tolerated by the patient, though she probably will need some type of protective

weightbearing with a walker of some type, but she can weightbear as tolerated on

the lower extremities.  I would recommend at least a walker if she is up moving.

She seemed to grasp the plan.

 

Thank for the pleasant consult.  If further consult or information is needed,

feel free to re-consult.  At this point, will sign off on her.  She will

follow-up in the orthopedic clinic 2 weeks after surgery with Dr. Bland's 
office.

BRANDY

## 2020-06-10 VITALS — SYSTOLIC BLOOD PRESSURE: 142 MMHG | DIASTOLIC BLOOD PRESSURE: 68 MMHG

## 2020-06-10 VITALS — SYSTOLIC BLOOD PRESSURE: 177 MMHG | DIASTOLIC BLOOD PRESSURE: 77 MMHG

## 2020-06-10 VITALS — SYSTOLIC BLOOD PRESSURE: 139 MMHG | DIASTOLIC BLOOD PRESSURE: 66 MMHG

## 2020-06-10 VITALS — SYSTOLIC BLOOD PRESSURE: 149 MMHG | DIASTOLIC BLOOD PRESSURE: 67 MMHG

## 2020-06-10 LAB
BASOPHILS # BLD AUTO: 0.2 10^3/UL (ref 0–0.2)
BASOPHILS NFR BLD AUTO: 1.3 % (ref 0–1)
BUN SERPL-MCNC: 29 MG/DL (ref 7–18)
CALCIUM SERPL-MCNC: 8.6 MG/DL (ref 8.8–10.2)
CHLORIDE SERPL-SCNC: 100 MEQ/L (ref 98–107)
CO2 SERPL-SCNC: 26 MEQ/L (ref 21–32)
CREAT SERPL-MCNC: 1.18 MG/DL (ref 0.55–1.3)
EOSINOPHIL # BLD AUTO: 1.2 10^3/UL (ref 0–0.5)
EOSINOPHIL NFR BLD AUTO: 10.4 % (ref 0–3)
GFR SERPL CREATININE-BSD FRML MDRD: 46.3 ML/MIN/{1.73_M2} (ref 32–?)
GLUCOSE SERPL-MCNC: 128 MG/DL (ref 70–100)
HCT VFR BLD AUTO: 28.9 % (ref 36–47)
HGB BLD-MCNC: 9.9 G/DL (ref 12–15.5)
LYMPHOCYTES # BLD AUTO: 1.5 10^3/UL (ref 1.5–5)
LYMPHOCYTES NFR BLD AUTO: 12.7 % (ref 24–44)
MCH RBC QN AUTO: 29.1 PG (ref 27–33)
MCHC RBC AUTO-ENTMCNC: 34.3 G/DL (ref 32–36.5)
MCV RBC AUTO: 85 FL (ref 80–96)
MONOCYTES # BLD AUTO: 1.5 10^3/UL (ref 0–0.8)
MONOCYTES NFR BLD AUTO: 12.6 % (ref 0–5)
NEUTROPHILS # BLD AUTO: 7.2 10^3/UL (ref 1.5–8.5)
NEUTROPHILS NFR BLD AUTO: 61.9 % (ref 36–66)
PLATELET # BLD AUTO: 289 10^3/UL (ref 150–450)
POTASSIUM SERPL-SCNC: 4.3 MEQ/L (ref 3.5–5.1)
RBC # BLD AUTO: 3.4 10^6/UL (ref 4–5.4)
SODIUM SERPL-SCNC: 131 MEQ/L (ref 136–145)
WBC # BLD AUTO: 11.5 10^3/UL (ref 4–10)

## 2020-06-10 RX ADMIN — WHITE PETROLATUM SCH DOSE: 57; 17 PASTE TOPICAL at 21:49

## 2020-06-10 RX ADMIN — WHITE PETROLATUM SCH DOSE: 57; 17 PASTE TOPICAL at 16:00

## 2020-06-10 RX ADMIN — APIXABAN SCH MG: 2.5 TABLET, FILM COATED ORAL at 09:03

## 2020-06-10 RX ADMIN — POLYETHYLENE GLYCOL 3350 SCH PKT: 17 POWDER, FOR SOLUTION ORAL at 09:00

## 2020-06-10 RX ADMIN — ACETAMINOPHEN SCH MG: 500 TABLET ORAL at 09:03

## 2020-06-10 RX ADMIN — DOCUSATE SODIUM,SENNOSIDES SCH TAB: 50; 8.6 TABLET, FILM COATED ORAL at 09:02

## 2020-06-10 RX ADMIN — PROBIOTIC PRODUCT - TAB SCH EA: TAB at 17:58

## 2020-06-10 RX ADMIN — METOPROLOL TARTRATE SCH MG: 50 TABLET, FILM COATED ORAL at 09:02

## 2020-06-10 RX ADMIN — MECLIZINE HYDROCHLORIDE SCH MG: 25 TABLET ORAL at 21:49

## 2020-06-10 RX ADMIN — DULOXETINE HYDROCHLORIDE SCH MG: 30 CAPSULE, DELAYED RELEASE ORAL at 14:03

## 2020-06-10 RX ADMIN — FERROUS SULFATE TAB 325 MG (65 MG ELEMENTAL FE) SCH MG: 325 (65 FE) TAB at 09:04

## 2020-06-10 RX ADMIN — MAGNESIUM OXIDE SCH MG: 400 TABLET ORAL at 09:02

## 2020-06-10 RX ADMIN — PANTOPRAZOLE SODIUM SCH MG: 40 TABLET, DELAYED RELEASE ORAL at 09:02

## 2020-06-10 RX ADMIN — MECLIZINE HYDROCHLORIDE SCH MG: 25 TABLET ORAL at 09:02

## 2020-06-10 RX ADMIN — APIXABAN SCH MG: 2.5 TABLET, FILM COATED ORAL at 21:48

## 2020-06-10 RX ADMIN — MECLIZINE HYDROCHLORIDE SCH MG: 25 TABLET ORAL at 17:58

## 2020-06-10 RX ADMIN — ATORVASTATIN CALCIUM SCH MG: 20 TABLET, FILM COATED ORAL at 21:47

## 2020-06-10 RX ADMIN — GABAPENTIN SCH MG: 100 CAPSULE ORAL at 09:02

## 2020-06-10 RX ADMIN — ACETAMINOPHEN SCH MG: 500 TABLET ORAL at 21:48

## 2020-06-10 RX ADMIN — GABAPENTIN SCH MG: 100 CAPSULE ORAL at 21:46

## 2020-06-10 RX ADMIN — DOCUSATE SODIUM,SENNOSIDES SCH TAB: 50; 8.6 TABLET, FILM COATED ORAL at 21:47

## 2020-06-10 RX ADMIN — LEVOTHYROXINE SODIUM SCH MCG: 75 TABLET ORAL at 05:48

## 2020-06-10 RX ADMIN — WHITE PETROLATUM SCH DOSE: 57; 17 PASTE TOPICAL at 09:00

## 2020-06-10 RX ADMIN — ACETAMINOPHEN SCH MG: 500 TABLET ORAL at 17:59

## 2020-06-10 RX ADMIN — METOPROLOL TARTRATE SCH MG: 50 TABLET, FILM COATED ORAL at 21:47

## 2020-06-10 RX ADMIN — FERROUS SULFATE TAB 325 MG (65 MG ELEMENTAL FE) SCH MG: 325 (65 FE) TAB at 21:48

## 2020-06-10 RX ADMIN — PROBIOTIC PRODUCT - TAB SCH EA: TAB at 14:03

## 2020-06-10 NOTE — REP
DEEP VENOUS ULTRASONOGRAPHY BILATERAL THIGH, RULE OUT DVT:

 

REASON FOR EXAM:  Bilateral pain and swelling with immobility.

 

TECHNIQUE:  Multiple ultrasonographic images of the deep venous structures of the

bilateral thigh were obtained from the common femoral vein to the popliteal vein

along with Doppler interrogation and color flow Doppler images.

 

FINDINGS:

 

There is no abnormal echogenic material seen within any of the visualized deep

venous structures that would suggest acute thrombosis.  Coaptation is

unremarkable throughout.  Doppler interrogation shows an expected response to

respiratory variability and augmentation.  The color flow images show what

appears to be a normal vascular pattern throughout.

 

IMPRESSION:

 

There is no ultrasonographic evidence of deep venous thrombosis involving any of

the visualized deep venous structures of the bilateral thigh, as described above.

 

 

 

Electronically Signed by

Roque Sharp DO 06/10/2020 05:07 P

## 2020-06-11 VITALS — SYSTOLIC BLOOD PRESSURE: 127 MMHG | DIASTOLIC BLOOD PRESSURE: 60 MMHG

## 2020-06-11 VITALS — SYSTOLIC BLOOD PRESSURE: 132 MMHG | DIASTOLIC BLOOD PRESSURE: 60 MMHG

## 2020-06-11 VITALS — SYSTOLIC BLOOD PRESSURE: 153 MMHG | DIASTOLIC BLOOD PRESSURE: 68 MMHG

## 2020-06-11 LAB
BASOPHILS # BLD AUTO: 0.1 10^3/UL (ref 0–0.2)
BASOPHILS NFR BLD AUTO: 0.8 % (ref 0–1)
EOSINOPHIL # BLD AUTO: 0.4 10^3/UL (ref 0–0.5)
EOSINOPHIL NFR BLD AUTO: 2.5 % (ref 0–3)
HCT VFR BLD AUTO: 28.8 % (ref 36–47)
HGB BLD-MCNC: 9.8 G/DL (ref 12–15.5)
LYMPHOCYTES # BLD AUTO: 1.4 10^3/UL (ref 1.5–5)
LYMPHOCYTES NFR BLD AUTO: 9.7 % (ref 24–44)
MCH RBC QN AUTO: 29.1 PG (ref 27–33)
MCHC RBC AUTO-ENTMCNC: 34 G/DL (ref 32–36.5)
MCV RBC AUTO: 85.5 FL (ref 80–96)
MONOCYTES # BLD AUTO: 1.4 10^3/UL (ref 0–0.8)
MONOCYTES NFR BLD AUTO: 9.8 % (ref 0–5)
NEUTROPHILS # BLD AUTO: 10.8 10^3/UL (ref 1.5–8.5)
NEUTROPHILS NFR BLD AUTO: 76.2 % (ref 36–66)
PLATELET # BLD AUTO: 289 10^3/UL (ref 150–450)
RBC # BLD AUTO: 3.37 10^6/UL (ref 4–5.4)
WBC # BLD AUTO: 14.1 10^3/UL (ref 4–10)

## 2020-06-11 RX ADMIN — MAGNESIUM OXIDE SCH MG: 400 TABLET ORAL at 09:46

## 2020-06-11 RX ADMIN — APIXABAN SCH MG: 2.5 TABLET, FILM COATED ORAL at 21:02

## 2020-06-11 RX ADMIN — ATORVASTATIN CALCIUM SCH MG: 20 TABLET, FILM COATED ORAL at 21:02

## 2020-06-11 RX ADMIN — DOCUSATE SODIUM,SENNOSIDES SCH TAB: 50; 8.6 TABLET, FILM COATED ORAL at 09:45

## 2020-06-11 RX ADMIN — PANTOPRAZOLE SODIUM SCH MG: 40 TABLET, DELAYED RELEASE ORAL at 09:46

## 2020-06-11 RX ADMIN — APIXABAN SCH MG: 2.5 TABLET, FILM COATED ORAL at 09:46

## 2020-06-11 RX ADMIN — WHITE PETROLATUM SCH DOSE: 57; 17 PASTE TOPICAL at 21:03

## 2020-06-11 RX ADMIN — PROBIOTIC PRODUCT - TAB SCH EA: TAB at 12:30

## 2020-06-11 RX ADMIN — ACETAMINOPHEN SCH MG: 500 TABLET ORAL at 21:02

## 2020-06-11 RX ADMIN — PROBIOTIC PRODUCT - TAB SCH EA: TAB at 16:55

## 2020-06-11 RX ADMIN — FERROUS SULFATE TAB 325 MG (65 MG ELEMENTAL FE) SCH MG: 325 (65 FE) TAB at 09:46

## 2020-06-11 RX ADMIN — LEVOTHYROXINE SODIUM SCH MCG: 75 TABLET ORAL at 06:43

## 2020-06-11 RX ADMIN — MECLIZINE HYDROCHLORIDE SCH MG: 25 TABLET ORAL at 16:55

## 2020-06-11 RX ADMIN — PROBIOTIC PRODUCT - TAB SCH EA: TAB at 09:45

## 2020-06-11 RX ADMIN — GABAPENTIN SCH MG: 100 CAPSULE ORAL at 09:46

## 2020-06-11 RX ADMIN — ACETAMINOPHEN SCH MG: 500 TABLET ORAL at 09:48

## 2020-06-11 RX ADMIN — MECLIZINE HYDROCHLORIDE SCH MG: 25 TABLET ORAL at 21:02

## 2020-06-11 RX ADMIN — POLYETHYLENE GLYCOL 3350 SCH PKT: 17 POWDER, FOR SOLUTION ORAL at 09:46

## 2020-06-11 RX ADMIN — WHITE PETROLATUM SCH DOSE: 57; 17 PASTE TOPICAL at 09:00

## 2020-06-11 RX ADMIN — WHITE PETROLATUM SCH DOSE: 57; 17 PASTE TOPICAL at 16:56

## 2020-06-11 RX ADMIN — DULOXETINE HYDROCHLORIDE SCH MG: 30 CAPSULE, DELAYED RELEASE ORAL at 09:46

## 2020-06-11 RX ADMIN — METOPROLOL TARTRATE SCH MG: 50 TABLET, FILM COATED ORAL at 21:02

## 2020-06-11 RX ADMIN — ACETAMINOPHEN SCH MG: 500 TABLET ORAL at 16:56

## 2020-06-11 RX ADMIN — GABAPENTIN SCH MG: 100 CAPSULE ORAL at 21:02

## 2020-06-11 RX ADMIN — METOPROLOL TARTRATE SCH MG: 50 TABLET, FILM COATED ORAL at 09:47

## 2020-06-11 RX ADMIN — DOCUSATE SODIUM,SENNOSIDES SCH TAB: 50; 8.6 TABLET, FILM COATED ORAL at 21:00

## 2020-06-11 RX ADMIN — MECLIZINE HYDROCHLORIDE SCH MG: 25 TABLET ORAL at 09:45

## 2020-06-11 RX ADMIN — FERROUS SULFATE TAB 325 MG (65 MG ELEMENTAL FE) SCH MG: 325 (65 FE) TAB at 21:02

## 2020-06-11 NOTE — CR.PDOC
General


Date of Consultation:  2020


Referring Provider:  FARAZ SHIN MD





Consultation


REASON FOR CONSULTATION/CHIEF COMPLAINT: Medical management. 





HISTORY OF PRESENT ILLNESS: This is 84 years old female with past medical 

history of PAD, carotid artery disease, atrial fibrillation, permanent pacemaker

placement, hypertension, hyperlipidemia, CAD, diabetes mellitus type 2, 

neuropathy, hypothyroidism, osteoarthritis C KD's lumbar spinal stenosis, 

chronic back pain, vertigo, GERD, hiatal hernia, anxiety disorder, L3 inferior 

plate fracture sustained in fall and subsequently was diagnosed with the subtro

chanteric fracture of proximal right femur. And she had the ORIF of right done 

on 2020 without any complication and sent to inpatient rehabilitation unit

for physical therapy.. 





ALLERGIES: Please see below.





HOME MEDICATIONS: Please see below.





PAST MEDICAL HISTORY:


L3 inferior plate fracture, recurrent falls, PAD, status post bilateral leg 

bypass, carotid, carotid disease status post left carotid endarterectomy, A. fib

status post ablation, PPM, hypertension, hyperlipidemia, CAD, diabetes mellitus,

neuropathy, hypothyroidism, osteoarthritis, chronic back pain, vertigo, 

labyrinthitis, GERD, hiatal hernia, urgent continence, CK D, lumbar spinal 

stenosis, anxiety and, seasonal allergies





PAST SURGICAL HISTORY: 


, Cholecystectomy, hysterectomy, pacemaker placement, cardiac ablation, low left

ER), cervical spine surgery  





FAMILY HISTORY:


 Diagnoses  secondary to blood cancer. One sister had diabetes mellitus





SOCIAL HISTORY:


 Denies smoking, alcohol or drug abuse





REVIEW OF SYSTEMS:


CONSTITUTIONAL: No fever or headache.


HEENT: No eyes, ear pain.


CARDIOVASCULAR: . No chest pain or palpitation.


RESPIRATORY: Shortness of breath or cough.


GENITOURINARY: No dysuria or frequency.


MUSCULOSKELETAL: Muscle or joint pain.


GASTROINTESTINAL: no Nausea, vomiting, diarrhea.


SKIN: No rash.


NEUROLOGICAL: Weakness or cranial nerve deficit.


PSYCHIATRIC: No anxiety or depression.


ENDOCRINE: History of diabetes.


HEMATOLOGIC/LYMPHATIC: No history of leukemia.


ALLERGIC/IMMUNOLOGIC: Allergies.





PHYSICAL EXAMINATION:


VITAL SIGNS: Please see below.


GENERAL APPEARANCE: Within normal limits.


HEENT: Noelle extraocular muscles intact.


RESPIRATORY: Clear to A&P.


CARDIOVASCULAR: S1, S2, regular.


ABDOMEN: , Soft, nontender, bowel sound present.


EXTREMITIES: No clubbing, cyanosis, edema.


NEUROLOGICAL: No motor focal sensory deficit.  


PSYCHIATRIC: No anxiety or depression.





LABORATORY DATA: Please see below.





ASSESSMENT/PLAN: 


#1. Right femur fracture status post ORIF


#2. Atrial fibrillation with controlled ventricular rate.


#3. Hypertension.


#4, PAD, status post bilateral leg bypass. 


#5. Hyperlipidemia. 


#6. CAD. 


#7. Diet-controlled Diabetes mellitus 


#8. Hypothyroidism. 


#9. GERD. 


#10. Chronic kidney disease





Patient admitted to inpatient rehabilitation unit at [facility] for gait

training and strengthening of her leg muscles and improve her ADLs


Continue patient's all present meds including eliquis, metoprolol, amlodipine 

and micardis


Repeat lab work checked. She has a slight leukocytosis of 14.1, which most 

likely secondary to steroid use while she was inpatient.


No evidence of any infection as she is afebrile and asymptomatic


Will repeat CBC and CMP frequently


She has a normal CMP and UA also does not indicate any infection


Thank you for calling this consult, Dr. Whittington, will gladly follow up this 

patient with you until she is discharged home





Vital Signs/I&O





Vital Signs








  Date Time  Temp Pulse Resp B/P (MAP) Pulse Ox O2 Delivery O2 Flow Rate FiO2


 


20 09:49   18   Room Air  


 


20 09:47  60  127/60    


 


20 06:41 97.2    96   














I&O- Last 24 Hours up to 6 AM 


 


 20





 06:00


 


Intake Total 1430 ml


 


Output Total 200 ml


 


Balance 1230 ml











Laboratory Data


Labs 24H


Laboratory Tests 2


20 06:16: 


Immature Granulocyte % (Auto) 1.0, Neutrophils (%) (Auto) 76.2H, Lymphocytes (%)

(Auto) 9.7L, Monocytes (%) (Auto) 9.8H, Eosinophils (%) (Auto) 2.5, Basophils 

(%) (Auto) 0.8, Neutrophils # (Auto) 10.8H, Lymphocytes # (Auto) 1.4L, Monocytes

# (Auto) 1.4H, Eosinophils # (Auto) 0.4, Basophils # (Auto) 0.1, Nucleated Red 

Blood Cells % (auto) 0.0


CBC/BMP


Laboratory Tests


20 06:16








Microbiology





Microbiology


20 Urine Culture - Final, Complete





Allergies


Coded Allergies:  


     Penicillins (Unverified  Allergy, Unknown, 20)


     latex (Unverified  Allergy, Unknown, 20)


     morphine (Unverified  Allergy, Unknown, 20)


   


   CONFIRMED DOSES ON  NO REACTION





Home Medications


Scheduled


Amlodipine Besylate (Amlodipine Besylate) 5 Mg Tablet, 5 MG PO BID, (Reported)


Apixaban (Eliquis) 5 Mg Tablet, 5 MG PO BID for 14 Days, #28


   Start at 2020 at 2000. 


Aspirin (Aspirin) 81 Mg Tab.chew, 81 MG PO DAILY, (Reported)


Atorvastatin Calcium (Atorvastatin Calcium) 20 Mg Tablet, 20 MG PO QHS, 

(Reported)


Buspirone HCl (Buspirone HCl) 5 Mg Tablet, 5 MG PO BID, (Reported)


Cholecalciferol (Vitamin D3) (Vitamin D-400) 10 Mcg Tablet, 400 UNITS PO DAILY, 

(Reported)


Docusate Sodium (Dok) 100 Mg Capsule, 100 MG PO DAILY, (Reported)


Furosemide (Furosemide) 40 Mg Tablet, 40 MG PO DAILY, (Reported)


Gabapentin (Gabapentin) 100 Mg Capsule, 100 MG PO BID, (Reported)


Levothyroxine Sodium (Levothyroxine Sodium) 75 Mcg Tablet, 75 MCG PO QAM, 

(Reported)


Magnesium Oxide (Magnesium Oxide) 400 Mg Tablet, 400 MG PO DAILY, (Reported)


Meclizine HCl (Meclizine HCl) 25 Mg Tablet, 25 MG PO TID, (Reported)


Metoprolol Tartrate (Metoprolol Tartrate) 50 Mg Tablet, 50 MG PO BID, (Reported)


Multivitamin with Folic Acid (Thera Tablet) 400 Mcg Tablet, 1 TAB PO DAILY, 

(Reported)


Oxybutynin Chloride (Oxybutynin Chloride) 5 Mg Tablet, 5 MG PO BID, (Reported)


Pantoprazole Sodium (Pantoprazole Sodium) 40 Mg Tablet.dr, 40 MG PO DAILY, 

(Reported)


Potassium Chloride (Potassium Chloride) 20 Meq Tab.er.prt, 20 MEQ PO BID, 

(Reported)


Telmisartan (Telmisartan) 20 Mg Tablet, 20 MG PO QHS, (Reported)





Scheduled PRN


Ondansetron (Ondansetron Odt) 4 Mg Tab.rapdis, 4 MG PO Q6HP PRN for NAUSEA OR 

VOMITING for 7 Days, #28


Oxycodone/Acetaminophen (Oxycodone-Acetaminophen 5-325) 1 Each Tablet, 2 TAB PO 

Q4H PRN for MILD/MODERATE PAIN (PS 1-7) for 2 Days, #24


Polyethylene Glycol 3350 (Polyethylene Glycol 3350) 17 Gm Powd.pack, 1 PKT PO 

DAILYPRN PRN for CONSTIPATION for 7 Days, #7


Sennosides/Docusate Sodium (Senna Plus Tablet) 1 Each Tablet, 1 TAB PO BID PRN 

for CONSTIPATION for 7 Days, #14











BIA TEJADA MD              2020 13:09

## 2020-06-12 VITALS — DIASTOLIC BLOOD PRESSURE: 64 MMHG | SYSTOLIC BLOOD PRESSURE: 131 MMHG

## 2020-06-12 VITALS — DIASTOLIC BLOOD PRESSURE: 58 MMHG | SYSTOLIC BLOOD PRESSURE: 121 MMHG

## 2020-06-12 LAB
BASOPHILS # BLD AUTO: 0.2 10^3/UL (ref 0–0.2)
BASOPHILS NFR BLD AUTO: 1.5 % (ref 0–1)
BUN SERPL-MCNC: 18 MG/DL (ref 7–18)
CALCIUM SERPL-MCNC: 9.1 MG/DL (ref 8.8–10.2)
CHLORIDE SERPL-SCNC: 101 MEQ/L (ref 98–107)
CO2 SERPL-SCNC: 26 MEQ/L (ref 21–32)
CREAT SERPL-MCNC: 1 MG/DL (ref 0.55–1.3)
EOSINOPHIL # BLD AUTO: 0.8 10^3/UL (ref 0–0.5)
EOSINOPHIL NFR BLD AUTO: 7.1 % (ref 0–3)
GFR SERPL CREATININE-BSD FRML MDRD: 56.1 ML/MIN/{1.73_M2} (ref 32–?)
GLUCOSE SERPL-MCNC: 88 MG/DL (ref 70–100)
HCT VFR BLD AUTO: 33.4 % (ref 36–47)
HGB BLD-MCNC: 10.9 G/DL (ref 12–15.5)
LYMPHOCYTES # BLD AUTO: 1.6 10^3/UL (ref 1.5–5)
LYMPHOCYTES NFR BLD AUTO: 14.2 % (ref 24–44)
MCH RBC QN AUTO: 28.2 PG (ref 27–33)
MCHC RBC AUTO-ENTMCNC: 32.6 G/DL (ref 32–36.5)
MCV RBC AUTO: 86.5 FL (ref 80–96)
MONOCYTES # BLD AUTO: 1.2 10^3/UL (ref 0–0.8)
MONOCYTES NFR BLD AUTO: 10.4 % (ref 0–5)
NEUTROPHILS # BLD AUTO: 7.3 10^3/UL (ref 1.5–8.5)
NEUTROPHILS NFR BLD AUTO: 65.8 % (ref 36–66)
PLATELET # BLD AUTO: 419 10^3/UL (ref 150–450)
POTASSIUM SERPL-SCNC: 4.5 MEQ/L (ref 3.5–5.1)
RBC # BLD AUTO: 3.86 10^6/UL (ref 4–5.4)
SODIUM SERPL-SCNC: 134 MEQ/L (ref 136–145)
WBC # BLD AUTO: 11 10^3/UL (ref 4–10)

## 2020-06-12 RX ADMIN — APIXABAN SCH MG: 2.5 TABLET, FILM COATED ORAL at 09:25

## 2020-06-12 RX ADMIN — FERROUS SULFATE TAB 325 MG (65 MG ELEMENTAL FE) SCH MG: 325 (65 FE) TAB at 09:25

## 2020-06-12 RX ADMIN — MAGNESIUM OXIDE SCH MG: 400 TABLET ORAL at 09:25

## 2020-06-12 RX ADMIN — ONDANSETRON PRN MG: 4 TABLET, ORALLY DISINTEGRATING ORAL at 12:14

## 2020-06-12 RX ADMIN — MECLIZINE HYDROCHLORIDE SCH MG: 25 TABLET ORAL at 21:33

## 2020-06-12 RX ADMIN — ATORVASTATIN CALCIUM SCH MG: 20 TABLET, FILM COATED ORAL at 21:33

## 2020-06-12 RX ADMIN — MECLIZINE HYDROCHLORIDE SCH MG: 25 TABLET ORAL at 09:25

## 2020-06-12 RX ADMIN — PROBIOTIC PRODUCT - TAB SCH EA: TAB at 12:15

## 2020-06-12 RX ADMIN — PROBIOTIC PRODUCT - TAB SCH EA: TAB at 17:03

## 2020-06-12 RX ADMIN — DOCUSATE SODIUM,SENNOSIDES SCH TAB: 50; 8.6 TABLET, FILM COATED ORAL at 09:00

## 2020-06-12 RX ADMIN — FERROUS SULFATE TAB 325 MG (65 MG ELEMENTAL FE) SCH MG: 325 (65 FE) TAB at 21:33

## 2020-06-12 RX ADMIN — METOPROLOL TARTRATE SCH MG: 50 TABLET, FILM COATED ORAL at 09:00

## 2020-06-12 RX ADMIN — DOCUSATE SODIUM,SENNOSIDES SCH TAB: 50; 8.6 TABLET, FILM COATED ORAL at 21:00

## 2020-06-12 RX ADMIN — WHITE PETROLATUM SCH DOSE: 57; 17 PASTE TOPICAL at 17:03

## 2020-06-12 RX ADMIN — WHITE PETROLATUM SCH DOSE: 57; 17 PASTE TOPICAL at 21:34

## 2020-06-12 RX ADMIN — METOPROLOL TARTRATE SCH MG: 50 TABLET, FILM COATED ORAL at 21:33

## 2020-06-12 RX ADMIN — POLYETHYLENE GLYCOL 3350 SCH PKT: 17 POWDER, FOR SOLUTION ORAL at 09:00

## 2020-06-12 RX ADMIN — MECLIZINE HYDROCHLORIDE SCH MG: 25 TABLET ORAL at 17:03

## 2020-06-12 RX ADMIN — GABAPENTIN SCH MG: 100 CAPSULE ORAL at 09:24

## 2020-06-12 RX ADMIN — ACETAMINOPHEN SCH MG: 500 TABLET ORAL at 21:34

## 2020-06-12 RX ADMIN — ACETAMINOPHEN SCH MG: 500 TABLET ORAL at 17:04

## 2020-06-12 RX ADMIN — APIXABAN SCH MG: 2.5 TABLET, FILM COATED ORAL at 21:33

## 2020-06-12 RX ADMIN — DULOXETINE HYDROCHLORIDE SCH MG: 30 CAPSULE, DELAYED RELEASE ORAL at 09:24

## 2020-06-12 RX ADMIN — PROBIOTIC PRODUCT - TAB SCH EA: TAB at 09:27

## 2020-06-12 RX ADMIN — GABAPENTIN SCH MG: 100 CAPSULE ORAL at 21:34

## 2020-06-12 RX ADMIN — PANTOPRAZOLE SODIUM SCH MG: 40 TABLET, DELAYED RELEASE ORAL at 09:25

## 2020-06-12 RX ADMIN — WHITE PETROLATUM SCH DOSE: 57; 17 PASTE TOPICAL at 09:26

## 2020-06-12 RX ADMIN — LEVOTHYROXINE SODIUM SCH MCG: 75 TABLET ORAL at 06:29

## 2020-06-12 RX ADMIN — ACETAMINOPHEN SCH MG: 500 TABLET ORAL at 09:26

## 2020-06-12 RX ADMIN — ONDANSETRON PRN MG: 4 TABLET, ORALLY DISINTEGRATING ORAL at 07:43

## 2020-06-13 VITALS — SYSTOLIC BLOOD PRESSURE: 161 MMHG | DIASTOLIC BLOOD PRESSURE: 74 MMHG

## 2020-06-13 VITALS — DIASTOLIC BLOOD PRESSURE: 64 MMHG | SYSTOLIC BLOOD PRESSURE: 133 MMHG

## 2020-06-13 VITALS — SYSTOLIC BLOOD PRESSURE: 120 MMHG | DIASTOLIC BLOOD PRESSURE: 57 MMHG

## 2020-06-13 RX ADMIN — DOCUSATE SODIUM,SENNOSIDES SCH TAB: 50; 8.6 TABLET, FILM COATED ORAL at 07:47

## 2020-06-13 RX ADMIN — WHITE PETROLATUM SCH DOSE: 57; 17 PASTE TOPICAL at 16:15

## 2020-06-13 RX ADMIN — WHITE PETROLATUM SCH DOSE: 57; 17 PASTE TOPICAL at 20:44

## 2020-06-13 RX ADMIN — FERROUS SULFATE TAB 325 MG (65 MG ELEMENTAL FE) SCH MG: 325 (65 FE) TAB at 20:42

## 2020-06-13 RX ADMIN — POLYETHYLENE GLYCOL 3350 SCH PKT: 17 POWDER, FOR SOLUTION ORAL at 07:46

## 2020-06-13 RX ADMIN — GABAPENTIN SCH MG: 100 CAPSULE ORAL at 20:39

## 2020-06-13 RX ADMIN — LEVOTHYROXINE SODIUM SCH MCG: 75 TABLET ORAL at 05:53

## 2020-06-13 RX ADMIN — APIXABAN SCH MG: 2.5 TABLET, FILM COATED ORAL at 20:42

## 2020-06-13 RX ADMIN — MECLIZINE HYDROCHLORIDE SCH MG: 25 TABLET ORAL at 20:41

## 2020-06-13 RX ADMIN — METOPROLOL TARTRATE SCH MG: 50 TABLET, FILM COATED ORAL at 20:41

## 2020-06-13 RX ADMIN — PROBIOTIC PRODUCT - TAB SCH EA: TAB at 17:00

## 2020-06-13 RX ADMIN — APIXABAN SCH MG: 2.5 TABLET, FILM COATED ORAL at 07:46

## 2020-06-13 RX ADMIN — ACETAMINOPHEN SCH MG: 500 TABLET ORAL at 16:15

## 2020-06-13 RX ADMIN — POLYETHYLENE GLYCOL 3350 SCH PKT: 17 POWDER, FOR SOLUTION ORAL at 07:49

## 2020-06-13 RX ADMIN — DOCUSATE SODIUM,SENNOSIDES SCH TAB: 50; 8.6 TABLET, FILM COATED ORAL at 20:42

## 2020-06-13 RX ADMIN — ATORVASTATIN CALCIUM SCH MG: 20 TABLET, FILM COATED ORAL at 20:43

## 2020-06-13 RX ADMIN — PROBIOTIC PRODUCT - TAB SCH EA: TAB at 11:32

## 2020-06-13 RX ADMIN — PROBIOTIC PRODUCT - TAB SCH EA: TAB at 07:45

## 2020-06-13 RX ADMIN — PANTOPRAZOLE SODIUM SCH MG: 40 TABLET, DELAYED RELEASE ORAL at 07:46

## 2020-06-13 RX ADMIN — ACETAMINOPHEN SCH MG: 500 TABLET ORAL at 20:42

## 2020-06-13 RX ADMIN — WHITE PETROLATUM SCH DOSE: 57; 17 PASTE TOPICAL at 07:49

## 2020-06-13 RX ADMIN — METOPROLOL TARTRATE SCH MG: 50 TABLET, FILM COATED ORAL at 07:48

## 2020-06-13 RX ADMIN — FERROUS SULFATE TAB 325 MG (65 MG ELEMENTAL FE) SCH MG: 325 (65 FE) TAB at 07:46

## 2020-06-13 RX ADMIN — MAGNESIUM OXIDE SCH MG: 400 TABLET ORAL at 07:46

## 2020-06-13 RX ADMIN — ACETAMINOPHEN SCH MG: 500 TABLET ORAL at 07:48

## 2020-06-13 RX ADMIN — DULOXETINE HYDROCHLORIDE SCH MG: 30 CAPSULE, DELAYED RELEASE ORAL at 07:46

## 2020-06-13 RX ADMIN — MECLIZINE HYDROCHLORIDE SCH MG: 25 TABLET ORAL at 16:15

## 2020-06-13 RX ADMIN — GABAPENTIN SCH MG: 100 CAPSULE ORAL at 07:46

## 2020-06-13 RX ADMIN — MECLIZINE HYDROCHLORIDE SCH MG: 25 TABLET ORAL at 07:46

## 2020-06-14 VITALS — DIASTOLIC BLOOD PRESSURE: 70 MMHG | SYSTOLIC BLOOD PRESSURE: 154 MMHG

## 2020-06-14 VITALS — SYSTOLIC BLOOD PRESSURE: 148 MMHG | DIASTOLIC BLOOD PRESSURE: 70 MMHG

## 2020-06-14 VITALS — SYSTOLIC BLOOD PRESSURE: 146 MMHG | DIASTOLIC BLOOD PRESSURE: 67 MMHG

## 2020-06-14 RX ADMIN — ATORVASTATIN CALCIUM SCH MG: 20 TABLET, FILM COATED ORAL at 21:51

## 2020-06-14 RX ADMIN — DULOXETINE HYDROCHLORIDE SCH MG: 30 CAPSULE, DELAYED RELEASE ORAL at 09:35

## 2020-06-14 RX ADMIN — WHITE PETROLATUM SCH DOSE: 57; 17 PASTE TOPICAL at 17:30

## 2020-06-14 RX ADMIN — ACETAMINOPHEN SCH MG: 500 TABLET ORAL at 17:30

## 2020-06-14 RX ADMIN — DOCUSATE SODIUM,SENNOSIDES SCH TAB: 50; 8.6 TABLET, FILM COATED ORAL at 09:36

## 2020-06-14 RX ADMIN — PANTOPRAZOLE SODIUM SCH MG: 40 TABLET, DELAYED RELEASE ORAL at 09:35

## 2020-06-14 RX ADMIN — PROBIOTIC PRODUCT - TAB SCH EA: TAB at 17:29

## 2020-06-14 RX ADMIN — ACETAMINOPHEN SCH MG: 500 TABLET ORAL at 09:37

## 2020-06-14 RX ADMIN — MECLIZINE HYDROCHLORIDE SCH MG: 25 TABLET ORAL at 21:51

## 2020-06-14 RX ADMIN — GABAPENTIN SCH MG: 100 CAPSULE ORAL at 09:35

## 2020-06-14 RX ADMIN — APIXABAN SCH MG: 2.5 TABLET, FILM COATED ORAL at 21:51

## 2020-06-14 RX ADMIN — PROBIOTIC PRODUCT - TAB SCH EA: TAB at 09:35

## 2020-06-14 RX ADMIN — MAGNESIUM OXIDE SCH MG: 400 TABLET ORAL at 09:41

## 2020-06-14 RX ADMIN — FERROUS SULFATE TAB 325 MG (65 MG ELEMENTAL FE) SCH MG: 325 (65 FE) TAB at 09:35

## 2020-06-14 RX ADMIN — MECLIZINE HYDROCHLORIDE SCH MG: 25 TABLET ORAL at 09:35

## 2020-06-14 RX ADMIN — METOPROLOL TARTRATE SCH MG: 50 TABLET, FILM COATED ORAL at 09:36

## 2020-06-14 RX ADMIN — APIXABAN SCH MG: 2.5 TABLET, FILM COATED ORAL at 09:35

## 2020-06-14 RX ADMIN — WHITE PETROLATUM SCH DOSE: 57; 17 PASTE TOPICAL at 21:00

## 2020-06-14 RX ADMIN — PROBIOTIC PRODUCT - TAB SCH EA: TAB at 12:30

## 2020-06-14 RX ADMIN — POLYETHYLENE GLYCOL 3350 SCH PKT: 17 POWDER, FOR SOLUTION ORAL at 09:34

## 2020-06-14 RX ADMIN — MECLIZINE HYDROCHLORIDE SCH MG: 25 TABLET ORAL at 17:29

## 2020-06-14 RX ADMIN — ACETAMINOPHEN SCH MG: 500 TABLET ORAL at 21:52

## 2020-06-14 RX ADMIN — DOCUSATE SODIUM,SENNOSIDES SCH TAB: 50; 8.6 TABLET, FILM COATED ORAL at 16:27

## 2020-06-14 RX ADMIN — WHITE PETROLATUM SCH DOSE: 57; 17 PASTE TOPICAL at 09:34

## 2020-06-14 RX ADMIN — FERROUS SULFATE TAB 325 MG (65 MG ELEMENTAL FE) SCH MG: 325 (65 FE) TAB at 21:51

## 2020-06-14 RX ADMIN — POLYETHYLENE GLYCOL 3350 SCH PKT: 17 POWDER, FOR SOLUTION ORAL at 16:28

## 2020-06-14 RX ADMIN — METOPROLOL TARTRATE SCH MG: 50 TABLET, FILM COATED ORAL at 21:53

## 2020-06-14 RX ADMIN — LEVOTHYROXINE SODIUM SCH MCG: 75 TABLET ORAL at 05:57

## 2020-06-14 RX ADMIN — GABAPENTIN SCH MG: 100 CAPSULE ORAL at 21:50

## 2020-06-15 VITALS — DIASTOLIC BLOOD PRESSURE: 67 MMHG | SYSTOLIC BLOOD PRESSURE: 151 MMHG

## 2020-06-15 VITALS — DIASTOLIC BLOOD PRESSURE: 60 MMHG | SYSTOLIC BLOOD PRESSURE: 160 MMHG

## 2020-06-15 VITALS — DIASTOLIC BLOOD PRESSURE: 70 MMHG | SYSTOLIC BLOOD PRESSURE: 141 MMHG

## 2020-06-15 LAB
BASOPHILS # BLD AUTO: 0.1 10^3/UL (ref 0–0.2)
BASOPHILS NFR BLD AUTO: 1.3 % (ref 0–1)
BUN SERPL-MCNC: 15 MG/DL (ref 7–18)
CALCIUM SERPL-MCNC: 9.1 MG/DL (ref 8.8–10.2)
CHLORIDE SERPL-SCNC: 100 MEQ/L (ref 98–107)
CO2 SERPL-SCNC: 28 MEQ/L (ref 21–32)
CREAT SERPL-MCNC: 0.93 MG/DL (ref 0.55–1.3)
EOSINOPHIL # BLD AUTO: 0.5 10^3/UL (ref 0–0.5)
EOSINOPHIL NFR BLD AUTO: 4.4 % (ref 0–3)
GFR SERPL CREATININE-BSD FRML MDRD: > 60 ML/MIN/{1.73_M2} (ref 32–?)
GLUCOSE SERPL-MCNC: 92 MG/DL (ref 70–100)
HCT VFR BLD AUTO: 33 % (ref 36–47)
HGB BLD-MCNC: 10.6 G/DL (ref 12–15.5)
LYMPHOCYTES # BLD AUTO: 1.1 10^3/UL (ref 1.5–5)
LYMPHOCYTES NFR BLD AUTO: 10.3 % (ref 24–44)
MCH RBC QN AUTO: 28.3 PG (ref 27–33)
MCHC RBC AUTO-ENTMCNC: 32.1 G/DL (ref 32–36.5)
MCV RBC AUTO: 88 FL (ref 80–96)
MONOCYTES # BLD AUTO: 1.3 10^3/UL (ref 0–0.8)
MONOCYTES NFR BLD AUTO: 11.3 % (ref 0–5)
NEUTROPHILS # BLD AUTO: 7.9 10^3/UL (ref 1.5–8.5)
NEUTROPHILS NFR BLD AUTO: 71.2 % (ref 36–66)
PLATELET # BLD AUTO: 445 10^3/UL (ref 150–450)
POTASSIUM SERPL-SCNC: 4.6 MEQ/L (ref 3.5–5.1)
RBC # BLD AUTO: 3.75 10^6/UL (ref 4–5.4)
SODIUM SERPL-SCNC: 132 MEQ/L (ref 136–145)
WBC # BLD AUTO: 11 10^3/UL (ref 4–10)

## 2020-06-15 RX ADMIN — ACETAMINOPHEN SCH MG: 500 TABLET ORAL at 17:16

## 2020-06-15 RX ADMIN — PROBIOTIC PRODUCT - TAB SCH EA: TAB at 07:51

## 2020-06-15 RX ADMIN — WHITE PETROLATUM SCH DOSE: 57; 17 PASTE TOPICAL at 21:22

## 2020-06-15 RX ADMIN — APIXABAN SCH MG: 2.5 TABLET, FILM COATED ORAL at 07:52

## 2020-06-15 RX ADMIN — ATORVASTATIN CALCIUM SCH MG: 20 TABLET, FILM COATED ORAL at 21:20

## 2020-06-15 RX ADMIN — DOCUSATE SODIUM,SENNOSIDES SCH TAB: 50; 8.6 TABLET, FILM COATED ORAL at 21:20

## 2020-06-15 RX ADMIN — FERROUS SULFATE TAB 325 MG (65 MG ELEMENTAL FE) SCH MG: 325 (65 FE) TAB at 21:21

## 2020-06-15 RX ADMIN — DOCUSATE SODIUM,SENNOSIDES SCH TAB: 50; 8.6 TABLET, FILM COATED ORAL at 07:52

## 2020-06-15 RX ADMIN — ONDANSETRON PRN MG: 4 TABLET, ORALLY DISINTEGRATING ORAL at 21:19

## 2020-06-15 RX ADMIN — MECLIZINE HYDROCHLORIDE SCH MG: 25 TABLET ORAL at 07:51

## 2020-06-15 RX ADMIN — GABAPENTIN SCH MG: 100 CAPSULE ORAL at 21:20

## 2020-06-15 RX ADMIN — PANTOPRAZOLE SODIUM SCH MG: 40 TABLET, DELAYED RELEASE ORAL at 07:53

## 2020-06-15 RX ADMIN — LEVOTHYROXINE SODIUM SCH MCG: 75 TABLET ORAL at 06:26

## 2020-06-15 RX ADMIN — APIXABAN SCH MG: 2.5 TABLET, FILM COATED ORAL at 21:21

## 2020-06-15 RX ADMIN — MECLIZINE HYDROCHLORIDE SCH MG: 25 TABLET ORAL at 21:20

## 2020-06-15 RX ADMIN — PROBIOTIC PRODUCT - TAB SCH EA: TAB at 12:07

## 2020-06-15 RX ADMIN — FERROUS SULFATE TAB 325 MG (65 MG ELEMENTAL FE) SCH MG: 325 (65 FE) TAB at 07:51

## 2020-06-15 RX ADMIN — ACETAMINOPHEN SCH MG: 500 TABLET ORAL at 21:21

## 2020-06-15 RX ADMIN — ACETAMINOPHEN SCH MG: 500 TABLET ORAL at 07:54

## 2020-06-15 RX ADMIN — WHITE PETROLATUM SCH DOSE: 57; 17 PASTE TOPICAL at 16:00

## 2020-06-15 RX ADMIN — PROBIOTIC PRODUCT - TAB SCH EA: TAB at 17:15

## 2020-06-15 RX ADMIN — GABAPENTIN SCH MG: 100 CAPSULE ORAL at 07:51

## 2020-06-15 RX ADMIN — MAGNESIUM OXIDE SCH MG: 400 TABLET ORAL at 07:53

## 2020-06-15 RX ADMIN — METOPROLOL TARTRATE SCH MG: 50 TABLET, FILM COATED ORAL at 21:22

## 2020-06-15 RX ADMIN — DULOXETINE HYDROCHLORIDE SCH MG: 30 CAPSULE, DELAYED RELEASE ORAL at 07:52

## 2020-06-15 RX ADMIN — METOPROLOL TARTRATE SCH MG: 50 TABLET, FILM COATED ORAL at 07:52

## 2020-06-15 RX ADMIN — MECLIZINE HYDROCHLORIDE SCH MG: 25 TABLET ORAL at 17:15

## 2020-06-15 RX ADMIN — WHITE PETROLATUM SCH DOSE: 57; 17 PASTE TOPICAL at 07:54

## 2020-06-15 NOTE — IPNPDOC
PM&R Progress Note


DATE OF SERVICE:  Vinh 10, 2020


Physiatrist Progress Note


Subjective:


Patient reporting she has hip soreness and is feeling sad today and that she 

doesn't know how much more fight she has in her. 








REVIEW OF SYSTEMS: The following is a completed review of systems and has been 

reviewed. Review of systems otherwise unremarkable.


PAIN: Patient self reports right hip pain


EYES: No recent vision changes


EARS, NOSE, & THROAT:+intermittent dysphagia solids and liquids (chronic)


CARDIOVASCULAR: Denies chest pain or palpitations


PULMONARY: Denies shortness of breath


GASTROINTESTINAL: +constipation 


GENITOURINARY:denies dysuria


MUSCULOSKELETAL: right hip fracture


NEUROLOGICAL:denies paresthesias


HEMATOLOGICAL: denies easy bruising, +anemia


SKIN:right hip incision


PSYCHIATRIC: Unremarkable


All other review of systems found to be negative.








PHYSICAL EXAMINATION:


VITAL SIGNS: Please see below.


GENERAL: Pleasant and cooperative. No acute distress. 


HEENT: PERRL. Extraocular movements intact. Clear conjunctiva


CARDIOVASCULAR: Regular rate and rhythm. No murmurs, rubs, or gallops


LUNGS: Clear to auscultation bilaterally. No wheezes. No rhonchi


ABDOMEN: Soft, nontender, nondistended. Positive bowel sounds. Normal active 

bowel sounds


NEUROLOGICAL: Alert and oriented times three. Cranial nerves II through XII 

grossly intact. Sensation grossly intact


EXTREMITIES: 5\5 strength bilateral upper extremities. 4+\5 strength right ankle

DF/EHL and PF (limited due to surgery) 4+/5 strength in left lower extremity. 





SKIN: right hip incision, blanchable sacral erythema











ASSESSMENT:85-year-old F with past medical history of Afib who presents status 

post fall with right femur fracture





PLAN:


1. PT/OT- advance gait and ADLs, strengthen/stretch/maintain ROM all4 limbs


-SLP- eval for swallow, patient endorsing intermittent dysphagia- change diet to

level 2 


2. Ortho- s/p right femur fracture and ORIF, ortho consulted- WBAT


-right medial foot pain reported on inpatient with follow-up Xray negative for 

gross fracture, however due to bone demineralization a small fracture was unable

to be ruled out-ortho consulted, recs appreciated, c/u WBAT


3. Cardiac: Afib s/p ablation on eliquis and metoprolol


-HTN c/u amlodipine,  Micardis on hold due to ALVERTO which is improving,  medicine 

consulted to assist in overall management


- CAD c/u ASA


-HLD c/u statin


4. Resp: c/u supplemental 02, encourgae incentive spirometry


5. Neuro: chronic vertigo c/u meclizine


6. Renal- hx of CKD with new ALVERTO improving following s/p blood transfusion 


7. Heme: post-op anemia, with Hgb today 8.2 up to 9.9 following 1 unit rbcs, ALVERTO

improved, as well


-patient had some blood per rectum today while attempting to have a bowel 

movement, but was visibly impacted,  will monitor Hgb 


-c/u iron


8. DVT ppx: c/u eliquis


-admission Doppler negative for DVT


9. GI ppx: Protonix


-patient with constipation and episode of rectal bleed while trying to pass hard

stool- bowel meds adjusted to optimize BMs


10. : hx of incontinence, now with retention, holding Ditropan, UA + for LE 

and bacteria, will start 3 day course of levaquin 


11. Pain: Tylenol, oxycodone, and gabapentin 


-will start cymbalta for pain and to help lift mood 


12. Endo: hypothyroidism- c/u synthroid


13. Psych- will lower busapr from 5mb BID to daily while starting Cymbalta to 

avoid serotonin syndrome 


14. Dispo: TBD


Allergies


Coded Allergies:  


     Penicillins (Unverified  Allergy, Unknown, 6/5/20)


     latex (Unverified  Allergy, Unknown, 6/5/20)


     morphine (Unverified  Allergy, Unknown, 6/7/20)


   


   CONFIRMED DOSES ON 6/5 NO REACTION





Vital Signs





Vital Signs








  Date Time  Temp Pulse Resp B/P (MAP) Pulse Ox O2 Delivery O2 Flow Rate FiO2


 


6/15/20 14:00 97.0 61 18 160/60 (93) 95 Room Air  











Laboratory Data


CBC/BMP


Laboratory Tests


6/15/20 10:41








Labs 24H


Laboratory Tests 2


6/15/20 10:41: 


Immature Granulocyte % (Auto) 1.5, Neutrophils (%) (Auto) 71.2H, Lymphocytes (%)

(Auto) 10.3L, Monocytes (%) (Auto) 11.3H, Eosinophils (%) (Auto) 4.4H, Basophils

(%) (Auto) 1.3H, Neutrophils # (Auto) 7.9, Lymphocytes # (Auto) 1.1L, Monocytes 

# (Auto) 1.3H, Eosinophils # (Auto) 0.5, Basophils # (Auto) 0.1, Nucleated Red 

Blood Cells % (auto) 0.0, Anion Gap 4L, Glomerular Filtration Rate > 60.0, 

Calcium Level 9.1





Microbiology





Microbiology


6/9/20 Urine Culture - Final, Complete





Current Medications


Current Medications





Current Medications








 Medications


  (Trade)  Dose


 Ordered  Sig/Carlos


 Route


 PRN Reason  Start Time


 Stop Time Status Last Admin


Dose Admin


 


 Acetaminophen


  (Tylenol Tab)  1,000 mg  Q8H


 PO


   6/8/20 22:00


 6/9/20 15:16 DC 6/9/20 13:20





 


 Acetaminophen


  (Tylenol Tab)  1,000 mg  TID


 PO


   6/9/20 21:00


    6/15/20 07:54





 


 Amlodipine


 Besylate


  (Norvasc)  5 mg  BID


 PO


   6/8/20 21:00


    6/15/20 07:54





 


 Apixaban


  (Eliquis)  2.5 mg  BID


 PO


   6/8/20 21:00


    6/15/20 07:52





 


 Atorvastatin


 Calcium


  (Lipitor)  20 mg  QHS


 PO


   6/8/20 21:00


    6/14/20 21:51





 


 Bisacodyl


  (Dulcolax


 Suppository)  10 mg  DAILYPRN  PRN


 [place]


 CONSTIPATION  6/10/20 13:00


 6/10/20 12:53 DC  





 


 Bisacodyl


  (Dulcolax


 Suppository)  10 mg  DAILYPRN  PRN


 IL


 CONSTIPATION  6/8/20 14:45


     





 


 Buspirone HCl


  (Buspar)  5 mg  BID


 PO


   6/8/20 21:00


 6/10/20 13:38 DC 6/10/20 09:04





 


 Buspirone HCl


  (Buspar)  5 mg  DAILY


 PO


   6/11/20 09:00


    6/15/20 07:54





 


 Duloxetine HCl


  (Cymbalta)  30 mg  DAILY


 PO


   6/10/20 09:00


    6/15/20 07:52





 


 Ferrous Sulfate


  (Ferrous Sulfate)  325 mg  BID


 PO


   6/9/20 21:00


    6/15/20 07:51





 


 Gabapentin


  (Neurontin)  100 mg  BID


 PO


   6/8/20 21:00


    6/15/20 07:51





 


 Lactobacillus


 Acidophilus


  (Bacid)  1 ea  WM


 PO


   6/10/20 12:30


    6/15/20 12:07





 


 Levofloxacin


  (Levaquin)  250 mg  DAILY@06


 PO


   6/10/20 06:00


 6/14/20 04:37 DC 6/13/20 05:50





 


 Levothyroxine


 Sodium


  (Synthroid)  75 mcg  DAILY@06


 PO


   6/9/20 06:00


    6/15/20 06:26





 


 Magnesium Oxide


  (Mag-Ox)  400 mg  DAILY


 PO


   6/9/20 09:00


    6/15/20 07:53





 


 Meclizine HCl


  (Antivert)  25 mg  TID


 PO


   6/8/20 16:00


    6/15/20 07:51





 


 Metoprolol


 Tartrate


  (Lopressor)  50 mg  BID


 PO


   6/8/20 21:00


    6/15/20 07:52





 


 Ondansetron HCl


  (Zofran Odt)  4 mg  Q4HP  PRN


 PO


 NAUSEA OR VOMITING  6/8/20 14:45


    6/12/20 12:14





 


 Oxybutynin


 Chloride


  (Ditropan)  5 mg  BID


 PO


   6/8/20 21:00


 6/9/20 13:50 DC 6/9/20 08:22





 


 Oxycodone HCl


  (Roxicodone,


 Oxyir)  5 mg  Q4HP  PRN


 PO


 PAIN  6/8/20 14:45


    6/14/20 21:53





 


 Pantoprazole


 Sodium


  (Protonix)  40 mg  DAILY


 PO


   6/9/20 09:00


    6/15/20 07:53





 


 Polyethylene


 Glycol


  (Miralax)  1 pkt  DAILY


 PO


   6/10/20 09:00


    6/14/20 09:34





 


 Senna/Docusate


 Sodium


  (Senokot S)  2 tab  BID


 PO


   6/8/20 21:00


    6/15/20 07:52





 


 Sodium


 Biphosphate/


 Sodium Phosphate


  (Fleet Enema)  1 ea  DAILYPRN  PRN


 IL


 CONSTIPATION  6/10/20 13:00


    6/10/20 21:49





 


 Telmisartan


  (Micardis)  20 mg  QHS


 PO


   6/8/20 21:00


 6/9/20 10:25 DC 6/8/20 21:06




















FARAZ SHIN MD         Vinh 15, 2020 15:30

## 2020-06-15 NOTE — IPNPDOC
PM&R Progress Note


DATE OF SERVICE:  Vinh 15, 2020


Physiatrist Progress Note


Subjective:


Patient reporting she is having bowel movements, no more bleeding per rectum, 

and that overall her right hip pain is improving. 








REVIEW OF SYSTEMS: The following is a completed review of systems and has been 

reviewed. Review of systems otherwise unremarkable.


PAIN: Patient self reports right hip pain


EYES: No recent vision changes


EARS, NOSE, & THROAT:+intermittent dysphagia solids and liquids (chronic)


CARDIOVASCULAR: Denies chest pain or palpitations


PULMONARY: Denies shortness of breath


GASTROINTESTINAL: +constipation 


GENITOURINARY:denies dysuria


MUSCULOSKELETAL: right hip fracture


NEUROLOGICAL:denies paresthesias


HEMATOLOGICAL: denies easy bruising, +anemia


SKIN:right hip incision


PSYCHIATRIC: Unremarkable


All other review of systems found to be negative.








PHYSICAL EXAMINATION:


VITAL SIGNS: Please see below.


GENERAL: Pleasant and cooperative. No acute distress. 


HEENT: PERRL. Extraocular movements intact. Clear conjunctiva


CARDIOVASCULAR: Regular rate and rhythm. No murmurs, rubs, or gallops


LUNGS: Clear to auscultation bilaterally. No wheezes. No rhonchi


ABDOMEN: Soft, nontender, nondistended. Positive bowel sounds. Normal active 

bowel sounds


NEUROLOGICAL: Alert and oriented times three. Cranial nerves II through XII 

grossly intact. Sensation grossly intact


EXTREMITIES: 5\5 strength bilateral upper extremities. 4+\5 strength right ankle

DF/EHL and PF (limited due to surgery) 4+/5 strength in left lower extremity. 





SKIN: right hip incision, blanchable sacral erythema











ASSESSMENT:85-year-old F with past medical history of Afib who presents status 

post fall with right femur fracture





PLAN:


1. PT/OT- advance gait and ADLs, strengthen/stretch/maintain ROM all4 limbs


-SLP- eval for swallow, patient endorsing intermittent dysphagia- change diet to

level 2 


2. Ortho- s/p right femur fracture and ORIF, ortho consulted- WBAT


-right medial foot pain reported on inpatient with follow-up Xray negative for 

gross fracture, however due to bone demineralization a small fracture was unable

to be ruled out-ortho consulted, recs appreciated, c/u WBAT


3. Cardiac: Afib s/p ablation on eliquis and metoprolol


-HTN c/u amlodipine,  Micardis on hold due to ALVERTO which is improving,  medicine 

consulted to assist in overall management


- CAD c/u ASA


-HLD c/u statin


4. Resp: c/u supplemental 02, encourgae incentive spirometry


5. Neuro: chronic vertigo c/u meclizine


6. Renal- hx of CKD with new ALVERTO improving following s/p blood transfusion 


7. Heme: post-op anemia, with Hgb today 8.2 up to 9.9 following 1 unit rbcs, ALVERTO

improved, as well


-patient had some blood per rectum 6-10-20  while attempting to have a bowel 

movement- resolved and no drop in Hgb 


-c/u iron


8. DVT ppx: c/u eliquis


-admission Doppler negative for DVT


9. GI ppx: Protonix


-patient with constipation and episode of rectal bleed 6-10-20 while trying to 

pass hard stool (resolved) c/u  bowel meds


10. : hx of incontinence, now with retention, holding Ditropan, UA + for LE 

and bacteria however Ucx negative, s/p 3 day course of levaquin 


11. Pain: Tylenol, oxycodone, and gabapentin 


-cymbalta effective for pain 


12. Endo: hypothyroidism- c/u synthroid


13. Psych-c/u buspar 5mg daily and cymbalta 


14. Dispo: TBD


Allergies


Coded Allergies:  


     Penicillins (Unverified  Allergy, Unknown, 6/5/20)


     latex (Unverified  Allergy, Unknown, 6/5/20)


     morphine (Unverified  Allergy, Unknown, 6/7/20)


   


   CONFIRMED DOSES ON 6/5 NO REACTION





Vital Signs





Vital Signs








  Date Time  Temp Pulse Resp B/P (MAP) Pulse Ox O2 Delivery O2 Flow Rate FiO2


 


6/15/20 14:00 97.0 61 18 160/60 (93) 95 Room Air  











Laboratory Data


CBC/BMP


Laboratory Tests


6/15/20 10:41








Labs 24H


Laboratory Tests 2


6/15/20 10:41: 


Immature Granulocyte % (Auto) 1.5, Neutrophils (%) (Auto) 71.2H, Lymphocytes (%)

(Auto) 10.3L, Monocytes (%) (Auto) 11.3H, Eosinophils (%) (Auto) 4.4H, Basophils

(%) (Auto) 1.3H, Neutrophils # (Auto) 7.9, Lymphocytes # (Auto) 1.1L, Monocytes 

# (Auto) 1.3H, Eosinophils # (Auto) 0.5, Basophils # (Auto) 0.1, Nucleated Red 

Blood Cells % (auto) 0.0, Anion Gap 4L, Glomerular Filtration Rate > 60.0, 

Calcium Level 9.1





Microbiology





Microbiology


6/9/20 Urine Culture - Final, Complete





Current Medications


Current Medications





Current Medications








 Medications


  (Trade)  Dose


 Ordered  Sig/Carlos


 Route


 PRN Reason  Start Time


 Stop Time Status Last Admin


Dose Admin


 


 Acetaminophen


  (Tylenol Tab)  1,000 mg  Q8H


 PO


   6/8/20 22:00


 6/9/20 15:16 DC 6/9/20 13:20





 


 Acetaminophen


  (Tylenol Tab)  1,000 mg  TID


 PO


   6/9/20 21:00


    6/15/20 07:54





 


 Amlodipine


 Besylate


  (Norvasc)  5 mg  BID


 PO


   6/8/20 21:00


    6/15/20 07:54





 


 Apixaban


  (Eliquis)  2.5 mg  BID


 PO


   6/8/20 21:00


    6/15/20 07:52





 


 Atorvastatin


 Calcium


  (Lipitor)  20 mg  QHS


 PO


   6/8/20 21:00


    6/14/20 21:51





 


 Bisacodyl


  (Dulcolax


 Suppository)  10 mg  DAILYPRN  PRN


 SC


 CONSTIPATION  6/10/20 13:00


 6/10/20 12:53 DC  





 


 Bisacodyl


  (Dulcolax


 Suppository)  10 mg  DAILYPRN  PRN


 SC


 CONSTIPATION  6/8/20 14:45


     





 


 Buspirone HCl


  (Buspar)  5 mg  BID


 PO


   6/8/20 21:00


 6/10/20 13:38 DC 6/10/20 09:04





 


 Buspirone HCl


  (Buspar)  5 mg  DAILY


 PO


   6/11/20 09:00


    6/15/20 07:54





 


 Duloxetine HCl


  (Cymbalta)  30 mg  DAILY


 PO


   6/10/20 09:00


    6/15/20 07:52





 


 Ferrous Sulfate


  (Ferrous Sulfate)  325 mg  BID


 PO


   6/9/20 21:00


    6/15/20 07:51





 


 Gabapentin


  (Neurontin)  100 mg  BID


 PO


   6/8/20 21:00


    6/15/20 07:51





 


 Lactobacillus


 Acidophilus


  (Bacid)  1 ea  WM


 PO


   6/10/20 12:30


    6/15/20 12:07





 


 Levofloxacin


  (Levaquin)  250 mg  DAILY@06


 PO


   6/10/20 06:00


 6/14/20 04:37 DC 6/13/20 05:50





 


 Levothyroxine


 Sodium


  (Synthroid)  75 mcg  DAILY@06


 PO


   6/9/20 06:00


    6/15/20 06:26





 


 Magnesium Oxide


  (Mag-Ox)  400 mg  DAILY


 PO


   6/9/20 09:00


    6/15/20 07:53





 


 Meclizine HCl


  (Antivert)  25 mg  TID


 PO


   6/8/20 16:00


    6/15/20 07:51





 


 Metoprolol


 Tartrate


  (Lopressor)  50 mg  BID


 PO


   6/8/20 21:00


    6/15/20 07:52





 


 Ondansetron HCl


  (Zofran Odt)  4 mg  Q4HP  PRN


 PO


 NAUSEA OR VOMITING  6/8/20 14:45


    6/12/20 12:14





 


 Oxybutynin


 Chloride


  (Ditropan)  5 mg  BID


 PO


   6/8/20 21:00


 6/9/20 13:50 DC 6/9/20 08:22





 


 Oxycodone HCl


  (Roxicodone,


 Oxyir)  5 mg  Q4HP  PRN


 PO


 PAIN  6/8/20 14:45


    6/14/20 21:53





 


 Pantoprazole


 Sodium


  (Protonix)  40 mg  DAILY


 PO


   6/9/20 09:00


    6/15/20 07:53





 


 Polyethylene


 Glycol


  (Miralax)  1 pkt  DAILY


 PO


   6/10/20 09:00


    6/14/20 09:34





 


 Senna/Docusate


 Sodium


  (Senokot S)  2 tab  BID


 PO


   6/8/20 21:00


    6/15/20 07:52





 


 Sodium


 Biphosphate/


 Sodium Phosphate


  (Fleet Enema)  1 ea  DAILYPRN  PRN


 SC


 CONSTIPATION  6/10/20 13:00


    6/10/20 21:49





 


 Telmisartan


  (Micardis)  20 mg  QHS


 PO


   6/8/20 21:00


 6/9/20 10:25 DC 6/8/20 21:06




















FARAZ SHIN MD         Vinh 15, 2020 15:33

## 2020-06-16 VITALS — SYSTOLIC BLOOD PRESSURE: 137 MMHG | DIASTOLIC BLOOD PRESSURE: 65 MMHG

## 2020-06-16 VITALS — DIASTOLIC BLOOD PRESSURE: 65 MMHG | SYSTOLIC BLOOD PRESSURE: 139 MMHG

## 2020-06-16 VITALS — DIASTOLIC BLOOD PRESSURE: 56 MMHG | SYSTOLIC BLOOD PRESSURE: 150 MMHG

## 2020-06-16 RX ADMIN — PROBIOTIC PRODUCT - TAB SCH EA: TAB at 19:05

## 2020-06-16 RX ADMIN — MECLIZINE HYDROCHLORIDE SCH MG: 25 TABLET ORAL at 16:01

## 2020-06-16 RX ADMIN — APIXABAN SCH MG: 2.5 TABLET, FILM COATED ORAL at 09:07

## 2020-06-16 RX ADMIN — ACETAMINOPHEN SCH MG: 500 TABLET ORAL at 09:07

## 2020-06-16 RX ADMIN — GABAPENTIN SCH MG: 100 CAPSULE ORAL at 21:14

## 2020-06-16 RX ADMIN — FERROUS SULFATE TAB 325 MG (65 MG ELEMENTAL FE) SCH MG: 325 (65 FE) TAB at 21:14

## 2020-06-16 RX ADMIN — ATORVASTATIN CALCIUM SCH MG: 20 TABLET, FILM COATED ORAL at 21:14

## 2020-06-16 RX ADMIN — WHITE PETROLATUM SCH DOSE: 57; 17 PASTE TOPICAL at 09:00

## 2020-06-16 RX ADMIN — GABAPENTIN SCH MG: 100 CAPSULE ORAL at 09:07

## 2020-06-16 RX ADMIN — DOCUSATE SODIUM,SENNOSIDES SCH TAB: 50; 8.6 TABLET, FILM COATED ORAL at 21:14

## 2020-06-16 RX ADMIN — ACETAMINOPHEN SCH MG: 500 TABLET ORAL at 21:13

## 2020-06-16 RX ADMIN — MECLIZINE HYDROCHLORIDE SCH MG: 25 TABLET ORAL at 21:14

## 2020-06-16 RX ADMIN — WHITE PETROLATUM SCH DOSE: 57; 17 PASTE TOPICAL at 16:00

## 2020-06-16 RX ADMIN — MAGNESIUM OXIDE SCH MG: 400 TABLET ORAL at 09:06

## 2020-06-16 RX ADMIN — METOPROLOL TARTRATE SCH MG: 50 TABLET, FILM COATED ORAL at 09:08

## 2020-06-16 RX ADMIN — DOCUSATE SODIUM,SENNOSIDES SCH TAB: 50; 8.6 TABLET, FILM COATED ORAL at 09:07

## 2020-06-16 RX ADMIN — ACETAMINOPHEN SCH MG: 500 TABLET ORAL at 16:01

## 2020-06-16 RX ADMIN — APIXABAN SCH MG: 2.5 TABLET, FILM COATED ORAL at 21:14

## 2020-06-16 RX ADMIN — WHITE PETROLATUM SCH DOSE: 57; 17 PASTE TOPICAL at 21:15

## 2020-06-16 RX ADMIN — DULOXETINE HYDROCHLORIDE SCH MG: 30 CAPSULE, DELAYED RELEASE ORAL at 09:07

## 2020-06-16 RX ADMIN — FERROUS SULFATE TAB 325 MG (65 MG ELEMENTAL FE) SCH MG: 325 (65 FE) TAB at 09:07

## 2020-06-16 RX ADMIN — MECLIZINE HYDROCHLORIDE SCH MG: 25 TABLET ORAL at 09:07

## 2020-06-16 RX ADMIN — POLYETHYLENE GLYCOL 3350 SCH PKT: 17 POWDER, FOR SOLUTION ORAL at 09:05

## 2020-06-16 RX ADMIN — PANTOPRAZOLE SODIUM SCH MG: 40 TABLET, DELAYED RELEASE ORAL at 09:07

## 2020-06-16 RX ADMIN — PROBIOTIC PRODUCT - TAB SCH EA: TAB at 09:06

## 2020-06-16 RX ADMIN — HYDROCORTISONE PRN DOSE: 25 OINTMENT TOPICAL at 16:02

## 2020-06-16 RX ADMIN — LEVOTHYROXINE SODIUM SCH MCG: 75 TABLET ORAL at 05:55

## 2020-06-16 RX ADMIN — METOPROLOL TARTRATE SCH MG: 50 TABLET, FILM COATED ORAL at 21:14

## 2020-06-16 RX ADMIN — PROBIOTIC PRODUCT - TAB SCH EA: TAB at 11:50

## 2020-06-17 VITALS — SYSTOLIC BLOOD PRESSURE: 160 MMHG | DIASTOLIC BLOOD PRESSURE: 71 MMHG

## 2020-06-17 VITALS — SYSTOLIC BLOOD PRESSURE: 169 MMHG | DIASTOLIC BLOOD PRESSURE: 72 MMHG

## 2020-06-17 VITALS — SYSTOLIC BLOOD PRESSURE: 146 MMHG | DIASTOLIC BLOOD PRESSURE: 67 MMHG

## 2020-06-17 VITALS — DIASTOLIC BLOOD PRESSURE: 68 MMHG | SYSTOLIC BLOOD PRESSURE: 150 MMHG

## 2020-06-17 LAB
BASOPHILS # BLD AUTO: 0.1 10^3/UL (ref 0–0.2)
BASOPHILS NFR BLD AUTO: 1.6 % (ref 0–1)
BUN SERPL-MCNC: 14 MG/DL (ref 7–18)
CALCIUM SERPL-MCNC: 9.3 MG/DL (ref 8.8–10.2)
CHLORIDE SERPL-SCNC: 100 MEQ/L (ref 98–107)
CO2 SERPL-SCNC: 26 MEQ/L (ref 21–32)
CREAT SERPL-MCNC: 0.92 MG/DL (ref 0.55–1.3)
EOSINOPHIL # BLD AUTO: 0.5 10^3/UL (ref 0–0.5)
EOSINOPHIL NFR BLD AUTO: 5.5 % (ref 0–3)
GFR SERPL CREATININE-BSD FRML MDRD: > 60 ML/MIN/{1.73_M2} (ref 32–?)
GLUCOSE SERPL-MCNC: 85 MG/DL (ref 70–100)
HCT VFR BLD AUTO: 31 % (ref 36–47)
HGB BLD-MCNC: 10.1 G/DL (ref 12–15.5)
LYMPHOCYTES # BLD AUTO: 1.3 10^3/UL (ref 1.5–5)
LYMPHOCYTES NFR BLD AUTO: 15.1 % (ref 24–44)
MCH RBC QN AUTO: 28.7 PG (ref 27–33)
MCHC RBC AUTO-ENTMCNC: 32.6 G/DL (ref 32–36.5)
MCV RBC AUTO: 88.1 FL (ref 80–96)
MONOCYTES # BLD AUTO: 0.8 10^3/UL (ref 0–0.8)
MONOCYTES NFR BLD AUTO: 9.5 % (ref 0–5)
NEUTROPHILS # BLD AUTO: 5.6 10^3/UL (ref 1.5–8.5)
NEUTROPHILS NFR BLD AUTO: 67.5 % (ref 36–66)
PLATELET # BLD AUTO: 477 10^3/UL (ref 150–450)
POTASSIUM SERPL-SCNC: 4.7 MEQ/L (ref 3.5–5.1)
RBC # BLD AUTO: 3.52 10^6/UL (ref 4–5.4)
SODIUM SERPL-SCNC: 131 MEQ/L (ref 136–145)
WBC # BLD AUTO: 8.4 10^3/UL (ref 4–10)

## 2020-06-17 RX ADMIN — WHITE PETROLATUM SCH DOSE: 57; 17 PASTE TOPICAL at 20:25

## 2020-06-17 RX ADMIN — DOCUSATE SODIUM,SENNOSIDES SCH TAB: 50; 8.6 TABLET, FILM COATED ORAL at 20:23

## 2020-06-17 RX ADMIN — MECLIZINE HYDROCHLORIDE SCH MG: 25 TABLET ORAL at 20:23

## 2020-06-17 RX ADMIN — PROBIOTIC PRODUCT - TAB SCH EA: TAB at 12:40

## 2020-06-17 RX ADMIN — WHITE PETROLATUM SCH DOSE: 57; 17 PASTE TOPICAL at 16:00

## 2020-06-17 RX ADMIN — ACETAMINOPHEN SCH MG: 500 TABLET ORAL at 16:15

## 2020-06-17 RX ADMIN — APIXABAN SCH MG: 2.5 TABLET, FILM COATED ORAL at 20:24

## 2020-06-17 RX ADMIN — MECLIZINE HYDROCHLORIDE SCH MG: 25 TABLET ORAL at 08:58

## 2020-06-17 RX ADMIN — APIXABAN SCH MG: 2.5 TABLET, FILM COATED ORAL at 08:58

## 2020-06-17 RX ADMIN — LEVOTHYROXINE SODIUM SCH MCG: 75 TABLET ORAL at 06:06

## 2020-06-17 RX ADMIN — DOCUSATE SODIUM,SENNOSIDES SCH TAB: 50; 8.6 TABLET, FILM COATED ORAL at 09:00

## 2020-06-17 RX ADMIN — FERROUS SULFATE TAB 325 MG (65 MG ELEMENTAL FE) SCH MG: 325 (65 FE) TAB at 20:24

## 2020-06-17 RX ADMIN — DULOXETINE HYDROCHLORIDE SCH MG: 30 CAPSULE, DELAYED RELEASE ORAL at 08:56

## 2020-06-17 RX ADMIN — METOPROLOL TARTRATE SCH MG: 50 TABLET, FILM COATED ORAL at 08:57

## 2020-06-17 RX ADMIN — PROBIOTIC PRODUCT - TAB SCH EA: TAB at 08:56

## 2020-06-17 RX ADMIN — ACETAMINOPHEN SCH MG: 500 TABLET ORAL at 08:58

## 2020-06-17 RX ADMIN — PROBIOTIC PRODUCT - TAB SCH EA: TAB at 16:14

## 2020-06-17 RX ADMIN — FERROUS SULFATE TAB 325 MG (65 MG ELEMENTAL FE) SCH MG: 325 (65 FE) TAB at 08:58

## 2020-06-17 RX ADMIN — POLYETHYLENE GLYCOL 3350 SCH PKT: 17 POWDER, FOR SOLUTION ORAL at 09:00

## 2020-06-17 RX ADMIN — METOPROLOL TARTRATE SCH MG: 50 TABLET, FILM COATED ORAL at 20:25

## 2020-06-17 RX ADMIN — GABAPENTIN SCH MG: 100 CAPSULE ORAL at 08:57

## 2020-06-17 RX ADMIN — MECLIZINE HYDROCHLORIDE SCH MG: 25 TABLET ORAL at 16:14

## 2020-06-17 RX ADMIN — ACETAMINOPHEN SCH MG: 500 TABLET ORAL at 20:24

## 2020-06-17 RX ADMIN — LIDOCAINE SCH PATCH: 50 PATCH CUTANEOUS at 20:23

## 2020-06-17 RX ADMIN — ATORVASTATIN CALCIUM SCH MG: 20 TABLET, FILM COATED ORAL at 20:23

## 2020-06-17 RX ADMIN — GABAPENTIN SCH MG: 100 CAPSULE ORAL at 20:24

## 2020-06-17 RX ADMIN — WHITE PETROLATUM SCH DOSE: 57; 17 PASTE TOPICAL at 08:59

## 2020-06-17 RX ADMIN — MAGNESIUM OXIDE SCH MG: 400 TABLET ORAL at 08:56

## 2020-06-17 RX ADMIN — PANTOPRAZOLE SODIUM SCH MG: 40 TABLET, DELAYED RELEASE ORAL at 08:58

## 2020-06-17 NOTE — IPNPDOC
PM&R Progress Note


DATE OF SERVICE:  Jun 16, 2020


Physiatrist Progress Note


Subjective:


Patient reporting she feels tired today and had right groin pain when she woke 

up. She states the pain medication helped and is open to trying a Lidoderm patch

at night. 








REVIEW OF SYSTEMS: The following is a completed review of systems and has been 

reviewed. Review of systems otherwise unremarkable.


PAIN: Patient self reports right hip pain


EYES: No recent vision changes


EARS, NOSE, & THROAT:+intermittent dysphagia solids and liquids (chronic)


CARDIOVASCULAR: Denies chest pain or palpitations


PULMONARY: Denies shortness of breath


GASTROINTESTINAL: +constipation (resolved)


GENITOURINARY:denies dysuria


MUSCULOSKELETAL: right hip fracture


NEUROLOGICAL:denies paresthesias


HEMATOLOGICAL: denies easy bruising, +anemia (improving)


SKIN:right hip incision


PSYCHIATRIC: Unremarkable


All other review of systems found to be negative.








PHYSICAL EXAMINATION:


VITAL SIGNS: Please see below.


GENERAL: Pleasant and cooperative. No acute distress. 


HEENT: PERRL. Extraocular movements intact. Clear conjunctiva


CARDIOVASCULAR: Regular rate and rhythm. No murmurs, rubs, or gallops


LUNGS: Clear to auscultation bilaterally. No wheezes. No rhonchi


ABDOMEN: Soft, nontender, nondistended. Positive bowel sounds. Normal active 

bowel sounds


NEUROLOGICAL: Alert and oriented times three. Cranial nerves II through XII 

grossly intact. Sensation grossly intact


EXTREMITIES: 5\5 strength bilateral upper extremities. 4+\5 strength right ankle

DF/EHL and PF (limited due to surgery) 4+/5 strength in left lower extremity. 





SKIN: right hip incision, blanchable sacral erythema











ASSESSMENT:85-year-old F with past medical history of Afib who presents status 

post fall with right femur fracture





PLAN:


1. PT/OT- advance gait and ADLs, strengthen/stretch/maintain ROM all4 limbs


-SLP- eval for swallow, patient endorsing intermittent dysphagia- change diet to

level 2 


2. Ortho- s/p right femur fracture and ORIF, ortho consulted- WBAT


-right medial foot pain reported on inpatient with follow-up Xray negative for 

gross fracture, however due to bone demineralization a small fracture was unable

to be ruled out-ortho consulted, recs appreciated, c/u WBAT


3. Cardiac: Afib s/p ablation on eliquis and metoprolol


-HTN c/u amlodipine,  Micardis on hold due to ALVERTO which is improving,  medicine 

consulted to assist in overall management


- CAD c/u ASA


-HLD c/u statin


4. Resp: c/u supplemental 02, encourgae incentive spirometry


5. Neuro: chronic vertigo c/u meclizine


6. Renal- hx of CKD with new ALVERTO improving following s/p blood transfusion 


7. Heme: post-op anemia, with Hgb today 8.2 up to 9.9 following 1 unit rbcs, ALVERTO

improved, as well


-patient had some blood per rectum 6-10-20  while attempting to have a bowel 

movement- resolved and no drop in Hgb 


-c/u iron


8. DVT ppx: c/u eliquis


-admission Doppler negative for DVT


9. GI ppx: Protonix


-patient with constipation and episode of rectal bleed 6-10-20 while trying to 

pass hard stool (resolved) c/u  bowel meds


10. : hx of incontinence, now with retention, holding Ditropan, UA + for LE 

and bacteria however Ucx negative, s/p 3 day course of levaquin 


11. Pain: Tylenol, oxycodone, and gabapentin 


-cymbalta effective for pain 


-will start lidoderm patch to right groin qHS


12. Endo: hypothyroidism- c/u synthroid


13. Psych-c/u buspar 5mg daily and cymbalta 


14. Hyponatremia- Na 131, will begin fluid restriction, no concerning neuro/GI 

symptoms


15. Dispo: 6-23-20 to home, progressing towards goals


Allergies


Coded Allergies:  


     Penicillins (Unverified  Allergy, Unknown, 6/5/20)


     latex (Unverified  Allergy, Unknown, 6/5/20)


     morphine (Unverified  Allergy, Unknown, 6/7/20)


   


   CONFIRMED DOSES ON 6/5 NO REACTION





Vital Signs





Vital Signs








  Date Time  Temp Pulse Resp B/P (MAP) Pulse Ox O2 Delivery O2 Flow Rate FiO2


 


6/17/20 08:57  60  146/67    


 


6/17/20 06:08 97.7  17  92 Room Air  











Laboratory Data


CBC/BMP


Laboratory Tests


6/17/20 06:16








Labs 24H


Laboratory Tests 2


6/17/20 06:16: 


Immature Granulocyte % (Auto) 0.8, Neutrophils (%) (Auto) 67.5H, Lymphocytes (%)

(Auto) 15.1L, Monocytes (%) (Auto) 9.5H, Eosinophils (%) (Auto) 5.5H, Basophils 

(%) (Auto) 1.6H, Neutrophils # (Auto) 5.6, Lymphocytes # (Auto) 1.3L, Monocytes 

# (Auto) 0.8, Eosinophils # (Auto) 0.5, Basophils # (Auto) 0.1, Nucleated Red 

Blood Cells % (auto) 0.0, Anion Gap 5L, Glomerular Filtration Rate > 60.0, 

Calcium Level 9.3





Microbiology





Microbiology


6/9/20 Urine Culture - Final, Complete





Current Medications


Current Medications





Current Medications








 Medications


  (Trade)  Dose


 Ordered  Sig/Carlos


 Route


 PRN Reason  Start Time


 Stop Time Status Last Admin


Dose Admin


 


 Acetaminophen


  (Tylenol Tab)  1,000 mg  Q8H


 PO


   6/8/20 22:00


 6/9/20 15:16 DC 6/9/20 13:20





 


 Acetaminophen


  (Tylenol Tab)  1,000 mg  TID


 PO


   6/9/20 21:00


    6/17/20 08:58





 


 Amlodipine


 Besylate


  (Norvasc)  5 mg  BID


 PO


   6/8/20 21:00


    6/17/20 08:57





 


 Apixaban


  (Eliquis)  2.5 mg  BID


 PO


   6/8/20 21:00


    6/17/20 08:58





 


 Atorvastatin


 Calcium


  (Lipitor)  20 mg  QHS


 PO


   6/8/20 21:00


    6/16/20 21:14





 


 Bisacodyl


  (Dulcolax


 Suppository)  10 mg  DAILYPRN  PRN


 AK


 CONSTIPATION  6/10/20 13:00


 6/10/20 12:53 DC  





 


 Bisacodyl


  (Dulcolax


 Suppository)  10 mg  DAILYPRN  PRN


 AK


 CONSTIPATION  6/8/20 14:45


     





 


 Buspirone HCl


  (Buspar)  5 mg  BID


 PO


   6/8/20 21:00


 6/10/20 13:38 DC 6/10/20 09:04





 


 Buspirone HCl


  (Buspar)  5 mg  DAILY


 PO


   6/11/20 09:00


    6/17/20 08:58





 


 Duloxetine HCl


  (Cymbalta)  30 mg  DAILY


 PO


   6/10/20 09:00


    6/17/20 08:56





 


 Ferrous Sulfate


  (Ferrous Sulfate)  325 mg  BID


 PO


   6/9/20 21:00


    6/17/20 08:58





 


 Gabapentin


  (Neurontin)  100 mg  BID


 PO


   6/8/20 21:00


    6/17/20 08:57





 


 Hydrocortisone


  (Hydrocortisone


 2.5% Ointment)  right


 antecubital


 area  BID  PRN


 TOP


 itch  6/16/20 14:00


    6/16/20 16:02





 


 Lactobacillus


 Acidophilus


  (Bacid)  1 ea  WM


 PO


   6/10/20 12:30


    6/17/20 08:56





 


 Levofloxacin


  (Levaquin)  250 mg  DAILY@06


 PO


   6/10/20 06:00


 6/14/20 04:37 DC 6/13/20 05:50





 


 Levothyroxine


 Sodium


  (Synthroid)  75 mcg  DAILY@06


 PO


   6/9/20 06:00


    6/17/20 06:06





 


 Magnesium Oxide


  (Mag-Ox)  400 mg  DAILY


 PO


   6/9/20 09:00


    6/17/20 08:56





 


 Meclizine HCl


  (Antivert)  25 mg  TID


 PO


   6/8/20 16:00


    6/17/20 08:58





 


 Metoprolol


 Tartrate


  (Lopressor)  50 mg  BID


 PO


   6/8/20 21:00


    6/17/20 08:57





 


 Ondansetron HCl


  (Zofran Odt)  4 mg  Q4HP  PRN


 PO


 NAUSEA OR VOMITING  6/8/20 14:45


    6/15/20 21:19





 


 Oxybutynin


 Chloride


  (Ditropan)  5 mg  BID


 PO


   6/8/20 21:00


 6/9/20 13:50 DC 6/9/20 08:22





 


 Oxycodone HCl


  (Roxicodone,


 Oxyir)  5 mg  Q4HP  PRN


 PO


 PAIN  6/8/20 14:45


    6/16/20 09:06





 


 Pantoprazole


 Sodium


  (Protonix)  40 mg  DAILY


 PO


   6/9/20 09:00


    6/17/20 08:58





 


 Polyethylene


 Glycol


  (Miralax)  1 pkt  DAILY


 PO


   6/10/20 09:00


    6/16/20 09:05





 


 Senna/Docusate


 Sodium


  (Senokot S)  2 tab  BID


 PO


   6/8/20 21:00


    6/16/20 21:14





 


 Sodium


 Biphosphate/


 Sodium Phosphate


  (Fleet Enema)  1 ea  DAILYPRN  PRN


 AK


 CONSTIPATION  6/10/20 13:00


    6/10/20 21:49





 


 Telmisartan


  (Micardis)  20 mg  QHS


 PO


   6/8/20 21:00


 6/9/20 10:25 DC 6/8/20 21:06




















FARAZ SHIN MD         Jun 17, 2020 09:53

## 2020-06-18 VITALS — SYSTOLIC BLOOD PRESSURE: 150 MMHG | DIASTOLIC BLOOD PRESSURE: 67 MMHG

## 2020-06-18 VITALS — SYSTOLIC BLOOD PRESSURE: 138 MMHG | DIASTOLIC BLOOD PRESSURE: 82 MMHG

## 2020-06-18 VITALS — SYSTOLIC BLOOD PRESSURE: 155 MMHG | DIASTOLIC BLOOD PRESSURE: 71 MMHG

## 2020-06-18 RX ADMIN — WHITE PETROLATUM SCH DOSE: 57; 17 PASTE TOPICAL at 16:00

## 2020-06-18 RX ADMIN — FERROUS SULFATE TAB 325 MG (65 MG ELEMENTAL FE) SCH MG: 325 (65 FE) TAB at 07:28

## 2020-06-18 RX ADMIN — LEVOTHYROXINE SODIUM SCH MCG: 75 TABLET ORAL at 05:28

## 2020-06-18 RX ADMIN — PROBIOTIC PRODUCT - TAB SCH EA: TAB at 16:45

## 2020-06-18 RX ADMIN — GABAPENTIN SCH MG: 100 CAPSULE ORAL at 07:28

## 2020-06-18 RX ADMIN — PROBIOTIC PRODUCT - TAB SCH EA: TAB at 12:47

## 2020-06-18 RX ADMIN — DOCUSATE SODIUM,SENNOSIDES SCH TAB: 50; 8.6 TABLET, FILM COATED ORAL at 07:29

## 2020-06-18 RX ADMIN — LIDOCAINE SCH PATCH: 50 PATCH CUTANEOUS at 20:23

## 2020-06-18 RX ADMIN — APIXABAN SCH MG: 2.5 TABLET, FILM COATED ORAL at 07:27

## 2020-06-18 RX ADMIN — MAGNESIUM OXIDE SCH MG: 400 TABLET ORAL at 07:27

## 2020-06-18 RX ADMIN — APIXABAN SCH MG: 2.5 TABLET, FILM COATED ORAL at 20:23

## 2020-06-18 RX ADMIN — PROBIOTIC PRODUCT - TAB SCH EA: TAB at 07:26

## 2020-06-18 RX ADMIN — PANTOPRAZOLE SODIUM SCH MG: 40 TABLET, DELAYED RELEASE ORAL at 07:27

## 2020-06-18 RX ADMIN — FERROUS SULFATE TAB 325 MG (65 MG ELEMENTAL FE) SCH MG: 325 (65 FE) TAB at 20:23

## 2020-06-18 RX ADMIN — ATORVASTATIN CALCIUM SCH MG: 20 TABLET, FILM COATED ORAL at 20:23

## 2020-06-18 RX ADMIN — Medication SCH: at 07:30

## 2020-06-18 RX ADMIN — METOPROLOL TARTRATE SCH MG: 50 TABLET, FILM COATED ORAL at 07:29

## 2020-06-18 RX ADMIN — ACETAMINOPHEN SCH MG: 500 TABLET ORAL at 20:23

## 2020-06-18 RX ADMIN — ACETAMINOPHEN SCH MG: 500 TABLET ORAL at 16:45

## 2020-06-18 RX ADMIN — MECLIZINE HYDROCHLORIDE SCH MG: 25 TABLET ORAL at 20:23

## 2020-06-18 RX ADMIN — HYDRALAZINE HYDROCHLORIDE SCH MG: 25 TABLET, FILM COATED ORAL at 12:48

## 2020-06-18 RX ADMIN — WHITE PETROLATUM SCH DOSE: 57; 17 PASTE TOPICAL at 07:29

## 2020-06-18 RX ADMIN — MECLIZINE HYDROCHLORIDE SCH MG: 25 TABLET ORAL at 07:28

## 2020-06-18 RX ADMIN — DULOXETINE HYDROCHLORIDE SCH MG: 30 CAPSULE, DELAYED RELEASE ORAL at 07:28

## 2020-06-18 RX ADMIN — METOPROLOL TARTRATE SCH MG: 50 TABLET, FILM COATED ORAL at 20:22

## 2020-06-18 RX ADMIN — ACETAMINOPHEN SCH MG: 500 TABLET ORAL at 07:27

## 2020-06-18 RX ADMIN — MECLIZINE HYDROCHLORIDE SCH MG: 25 TABLET ORAL at 16:45

## 2020-06-18 RX ADMIN — HYDRALAZINE HYDROCHLORIDE SCH MG: 25 TABLET, FILM COATED ORAL at 16:45

## 2020-06-18 RX ADMIN — POLYETHYLENE GLYCOL 3350 SCH PKT: 17 POWDER, FOR SOLUTION ORAL at 07:29

## 2020-06-18 RX ADMIN — DOCUSATE SODIUM,SENNOSIDES SCH TAB: 50; 8.6 TABLET, FILM COATED ORAL at 20:22

## 2020-06-18 RX ADMIN — GABAPENTIN SCH MG: 100 CAPSULE ORAL at 20:23

## 2020-06-18 RX ADMIN — WHITE PETROLATUM SCH DOSE: 57; 17 PASTE TOPICAL at 20:24

## 2020-06-19 VITALS — SYSTOLIC BLOOD PRESSURE: 160 MMHG | DIASTOLIC BLOOD PRESSURE: 70 MMHG

## 2020-06-19 VITALS — DIASTOLIC BLOOD PRESSURE: 74 MMHG | SYSTOLIC BLOOD PRESSURE: 155 MMHG

## 2020-06-19 VITALS — SYSTOLIC BLOOD PRESSURE: 134 MMHG | DIASTOLIC BLOOD PRESSURE: 62 MMHG

## 2020-06-19 VITALS — SYSTOLIC BLOOD PRESSURE: 141 MMHG | DIASTOLIC BLOOD PRESSURE: 65 MMHG

## 2020-06-19 LAB
BASOPHILS # BLD AUTO: 0.1 10^3/UL (ref 0–0.2)
BASOPHILS NFR BLD AUTO: 1.5 % (ref 0–1)
BUN SERPL-MCNC: 13 MG/DL (ref 7–18)
CALCIUM SERPL-MCNC: 8.8 MG/DL (ref 8.8–10.2)
CHLORIDE SERPL-SCNC: 102 MEQ/L (ref 98–107)
CO2 SERPL-SCNC: 27 MEQ/L (ref 21–32)
CREAT SERPL-MCNC: 1.04 MG/DL (ref 0.55–1.3)
EOSINOPHIL # BLD AUTO: 0.3 10^3/UL (ref 0–0.5)
EOSINOPHIL NFR BLD AUTO: 3.8 % (ref 0–3)
GFR SERPL CREATININE-BSD FRML MDRD: 53.6 ML/MIN/{1.73_M2} (ref 32–?)
GLUCOSE SERPL-MCNC: 148 MG/DL (ref 70–100)
HCT VFR BLD AUTO: 31.4 % (ref 36–47)
HGB BLD-MCNC: 10.4 G/DL (ref 12–15.5)
LYMPHOCYTES # BLD AUTO: 1.1 10^3/UL (ref 1.5–5)
LYMPHOCYTES NFR BLD AUTO: 12.4 % (ref 24–44)
MCH RBC QN AUTO: 29.2 PG (ref 27–33)
MCHC RBC AUTO-ENTMCNC: 33.1 G/DL (ref 32–36.5)
MCV RBC AUTO: 88.2 FL (ref 80–96)
MONOCYTES # BLD AUTO: 0.9 10^3/UL (ref 0–0.8)
MONOCYTES NFR BLD AUTO: 10.4 % (ref 0–5)
NEUTROPHILS # BLD AUTO: 6.2 10^3/UL (ref 1.5–8.5)
NEUTROPHILS NFR BLD AUTO: 71.3 % (ref 36–66)
PLATELET # BLD AUTO: 477 10^3/UL (ref 150–450)
POTASSIUM SERPL-SCNC: 3.9 MEQ/L (ref 3.5–5.1)
RBC # BLD AUTO: 3.56 10^6/UL (ref 4–5.4)
SODIUM SERPL-SCNC: 135 MEQ/L (ref 136–145)
WBC # BLD AUTO: 8.7 10^3/UL (ref 4–10)

## 2020-06-19 RX ADMIN — ACETAMINOPHEN SCH MG: 500 TABLET ORAL at 15:46

## 2020-06-19 RX ADMIN — PROBIOTIC PRODUCT - TAB SCH EA: TAB at 12:41

## 2020-06-19 RX ADMIN — MECLIZINE HYDROCHLORIDE SCH MG: 25 TABLET ORAL at 20:40

## 2020-06-19 RX ADMIN — GABAPENTIN SCH MG: 100 CAPSULE ORAL at 08:00

## 2020-06-19 RX ADMIN — POLYETHYLENE GLYCOL 3350 SCH PKT: 17 POWDER, FOR SOLUTION ORAL at 08:01

## 2020-06-19 RX ADMIN — DOCUSATE SODIUM,SENNOSIDES SCH TAB: 50; 8.6 TABLET, FILM COATED ORAL at 20:39

## 2020-06-19 RX ADMIN — MECLIZINE HYDROCHLORIDE SCH MG: 25 TABLET ORAL at 08:00

## 2020-06-19 RX ADMIN — GABAPENTIN SCH MG: 100 CAPSULE ORAL at 20:40

## 2020-06-19 RX ADMIN — Medication SCH: at 08:01

## 2020-06-19 RX ADMIN — PROBIOTIC PRODUCT - TAB SCH EA: TAB at 17:17

## 2020-06-19 RX ADMIN — HYDRALAZINE HYDROCHLORIDE SCH MG: 25 TABLET, FILM COATED ORAL at 00:30

## 2020-06-19 RX ADMIN — PROBIOTIC PRODUCT - TAB SCH EA: TAB at 08:00

## 2020-06-19 RX ADMIN — MECLIZINE HYDROCHLORIDE SCH MG: 25 TABLET ORAL at 15:46

## 2020-06-19 RX ADMIN — ACETAMINOPHEN SCH MG: 500 TABLET ORAL at 20:38

## 2020-06-19 RX ADMIN — FERROUS SULFATE TAB 325 MG (65 MG ELEMENTAL FE) SCH MG: 325 (65 FE) TAB at 07:59

## 2020-06-19 RX ADMIN — ACETAMINOPHEN SCH MG: 500 TABLET ORAL at 07:59

## 2020-06-19 RX ADMIN — APIXABAN SCH MG: 2.5 TABLET, FILM COATED ORAL at 08:00

## 2020-06-19 RX ADMIN — MAGNESIUM OXIDE SCH MG: 400 TABLET ORAL at 08:00

## 2020-06-19 RX ADMIN — POLYETHYLENE GLYCOL 3350 SCH PKT: 17 POWDER, FOR SOLUTION ORAL at 08:34

## 2020-06-19 RX ADMIN — LEVOTHYROXINE SODIUM SCH MCG: 75 TABLET ORAL at 06:02

## 2020-06-19 RX ADMIN — FERROUS SULFATE TAB 325 MG (65 MG ELEMENTAL FE) SCH MG: 325 (65 FE) TAB at 20:40

## 2020-06-19 RX ADMIN — APIXABAN SCH MG: 2.5 TABLET, FILM COATED ORAL at 20:39

## 2020-06-19 RX ADMIN — WHITE PETROLATUM SCH DOSE: 57; 17 PASTE TOPICAL at 20:44

## 2020-06-19 RX ADMIN — LIDOCAINE SCH PATCH: 50 PATCH CUTANEOUS at 20:41

## 2020-06-19 RX ADMIN — DOCUSATE SODIUM,SENNOSIDES SCH TAB: 50; 8.6 TABLET, FILM COATED ORAL at 07:59

## 2020-06-19 RX ADMIN — PANTOPRAZOLE SODIUM SCH MG: 40 TABLET, DELAYED RELEASE ORAL at 08:00

## 2020-06-19 RX ADMIN — HYDRALAZINE HYDROCHLORIDE SCH MG: 25 TABLET, FILM COATED ORAL at 12:41

## 2020-06-19 RX ADMIN — WHITE PETROLATUM SCH DOSE: 57; 17 PASTE TOPICAL at 08:01

## 2020-06-19 RX ADMIN — METOPROLOL TARTRATE SCH MG: 50 TABLET, FILM COATED ORAL at 08:00

## 2020-06-19 RX ADMIN — HYDRALAZINE HYDROCHLORIDE SCH MG: 25 TABLET, FILM COATED ORAL at 17:18

## 2020-06-19 RX ADMIN — ATORVASTATIN CALCIUM SCH MG: 20 TABLET, FILM COATED ORAL at 20:39

## 2020-06-19 RX ADMIN — DULOXETINE HYDROCHLORIDE SCH MG: 30 CAPSULE, DELAYED RELEASE ORAL at 08:00

## 2020-06-19 RX ADMIN — METOPROLOL TARTRATE SCH MG: 50 TABLET, FILM COATED ORAL at 20:40

## 2020-06-19 RX ADMIN — POLYETHYLENE GLYCOL 3350 SCH PKT: 17 POWDER, FOR SOLUTION ORAL at 08:00

## 2020-06-19 RX ADMIN — WHITE PETROLATUM SCH DOSE: 57; 17 PASTE TOPICAL at 15:46

## 2020-06-19 RX ADMIN — HYDRALAZINE HYDROCHLORIDE SCH MG: 25 TABLET, FILM COATED ORAL at 06:02

## 2020-06-19 RX ADMIN — HYDRALAZINE HYDROCHLORIDE SCH MG: 25 TABLET, FILM COATED ORAL at 23:58

## 2020-06-20 VITALS — SYSTOLIC BLOOD PRESSURE: 149 MMHG | DIASTOLIC BLOOD PRESSURE: 68 MMHG

## 2020-06-20 VITALS — DIASTOLIC BLOOD PRESSURE: 68 MMHG | SYSTOLIC BLOOD PRESSURE: 137 MMHG

## 2020-06-20 VITALS — DIASTOLIC BLOOD PRESSURE: 67 MMHG | SYSTOLIC BLOOD PRESSURE: 145 MMHG

## 2020-06-20 RX ADMIN — LIDOCAINE SCH PATCH: 50 PATCH CUTANEOUS at 20:22

## 2020-06-20 RX ADMIN — LEVOTHYROXINE SODIUM SCH MCG: 75 TABLET ORAL at 05:42

## 2020-06-20 RX ADMIN — WHITE PETROLATUM SCH DOSE: 57; 17 PASTE TOPICAL at 17:36

## 2020-06-20 RX ADMIN — PROBIOTIC PRODUCT - TAB SCH EA: TAB at 12:25

## 2020-06-20 RX ADMIN — HYDRALAZINE HYDROCHLORIDE SCH MG: 25 TABLET, FILM COATED ORAL at 05:42

## 2020-06-20 RX ADMIN — MAGNESIUM OXIDE SCH MG: 400 TABLET ORAL at 07:54

## 2020-06-20 RX ADMIN — HYDRALAZINE HYDROCHLORIDE SCH MG: 25 TABLET, FILM COATED ORAL at 17:35

## 2020-06-20 RX ADMIN — APIXABAN SCH MG: 2.5 TABLET, FILM COATED ORAL at 07:54

## 2020-06-20 RX ADMIN — PANTOPRAZOLE SODIUM SCH MG: 40 TABLET, DELAYED RELEASE ORAL at 07:53

## 2020-06-20 RX ADMIN — ACETAMINOPHEN SCH MG: 500 TABLET ORAL at 17:34

## 2020-06-20 RX ADMIN — ATORVASTATIN CALCIUM SCH MG: 20 TABLET, FILM COATED ORAL at 20:21

## 2020-06-20 RX ADMIN — POLYETHYLENE GLYCOL 3350 SCH PKT: 17 POWDER, FOR SOLUTION ORAL at 07:53

## 2020-06-20 RX ADMIN — Medication SCH: at 10:47

## 2020-06-20 RX ADMIN — DOCUSATE SODIUM,SENNOSIDES SCH TAB: 50; 8.6 TABLET, FILM COATED ORAL at 20:21

## 2020-06-20 RX ADMIN — ACETAMINOPHEN SCH MG: 500 TABLET ORAL at 07:55

## 2020-06-20 RX ADMIN — GABAPENTIN SCH MG: 100 CAPSULE ORAL at 07:53

## 2020-06-20 RX ADMIN — MECLIZINE HYDROCHLORIDE SCH MG: 25 TABLET ORAL at 20:21

## 2020-06-20 RX ADMIN — MECLIZINE HYDROCHLORIDE SCH MG: 25 TABLET ORAL at 07:53

## 2020-06-20 RX ADMIN — GABAPENTIN SCH MG: 100 CAPSULE ORAL at 20:21

## 2020-06-20 RX ADMIN — DOCUSATE SODIUM,SENNOSIDES SCH TAB: 50; 8.6 TABLET, FILM COATED ORAL at 07:55

## 2020-06-20 RX ADMIN — HYDRALAZINE HYDROCHLORIDE SCH MG: 25 TABLET, FILM COATED ORAL at 12:25

## 2020-06-20 RX ADMIN — DULOXETINE HYDROCHLORIDE SCH MG: 30 CAPSULE, DELAYED RELEASE ORAL at 07:54

## 2020-06-20 RX ADMIN — WHITE PETROLATUM SCH DOSE: 57; 17 PASTE TOPICAL at 20:22

## 2020-06-20 RX ADMIN — METOPROLOL TARTRATE SCH MG: 50 TABLET, FILM COATED ORAL at 20:21

## 2020-06-20 RX ADMIN — METOPROLOL TARTRATE SCH MG: 50 TABLET, FILM COATED ORAL at 07:54

## 2020-06-20 RX ADMIN — PROBIOTIC PRODUCT - TAB SCH EA: TAB at 17:33

## 2020-06-20 RX ADMIN — WHITE PETROLATUM SCH DOSE: 57; 17 PASTE TOPICAL at 07:55

## 2020-06-20 RX ADMIN — APIXABAN SCH MG: 2.5 TABLET, FILM COATED ORAL at 20:21

## 2020-06-20 RX ADMIN — PROBIOTIC PRODUCT - TAB SCH EA: TAB at 07:54

## 2020-06-20 RX ADMIN — ACETAMINOPHEN SCH MG: 500 TABLET ORAL at 20:22

## 2020-06-20 RX ADMIN — MECLIZINE HYDROCHLORIDE SCH MG: 25 TABLET ORAL at 17:33

## 2020-06-20 RX ADMIN — FERROUS SULFATE TAB 325 MG (65 MG ELEMENTAL FE) SCH MG: 325 (65 FE) TAB at 07:53

## 2020-06-20 RX ADMIN — FERROUS SULFATE TAB 325 MG (65 MG ELEMENTAL FE) SCH MG: 325 (65 FE) TAB at 20:21

## 2020-06-21 VITALS — DIASTOLIC BLOOD PRESSURE: 74 MMHG | SYSTOLIC BLOOD PRESSURE: 150 MMHG

## 2020-06-21 VITALS — DIASTOLIC BLOOD PRESSURE: 74 MMHG | SYSTOLIC BLOOD PRESSURE: 139 MMHG

## 2020-06-21 VITALS — SYSTOLIC BLOOD PRESSURE: 130 MMHG | DIASTOLIC BLOOD PRESSURE: 62 MMHG

## 2020-06-21 VITALS — DIASTOLIC BLOOD PRESSURE: 54 MMHG | SYSTOLIC BLOOD PRESSURE: 154 MMHG

## 2020-06-21 VITALS — DIASTOLIC BLOOD PRESSURE: 69 MMHG | SYSTOLIC BLOOD PRESSURE: 159 MMHG

## 2020-06-21 RX ADMIN — MECLIZINE HYDROCHLORIDE SCH MG: 25 TABLET ORAL at 20:11

## 2020-06-21 RX ADMIN — HYDRALAZINE HYDROCHLORIDE SCH MG: 25 TABLET, FILM COATED ORAL at 05:17

## 2020-06-21 RX ADMIN — HYDRALAZINE HYDROCHLORIDE SCH MG: 25 TABLET, FILM COATED ORAL at 00:00

## 2020-06-21 RX ADMIN — Medication SCH: at 08:36

## 2020-06-21 RX ADMIN — DULOXETINE HYDROCHLORIDE SCH MG: 30 CAPSULE, DELAYED RELEASE ORAL at 08:29

## 2020-06-21 RX ADMIN — WHITE PETROLATUM SCH DOSE: 57; 17 PASTE TOPICAL at 08:36

## 2020-06-21 RX ADMIN — HYDRALAZINE HYDROCHLORIDE SCH MG: 25 TABLET, FILM COATED ORAL at 11:52

## 2020-06-21 RX ADMIN — APIXABAN SCH MG: 2.5 TABLET, FILM COATED ORAL at 20:13

## 2020-06-21 RX ADMIN — MECLIZINE HYDROCHLORIDE SCH MG: 25 TABLET ORAL at 17:38

## 2020-06-21 RX ADMIN — LEVOTHYROXINE SODIUM SCH MCG: 75 TABLET ORAL at 05:48

## 2020-06-21 RX ADMIN — FERROUS SULFATE TAB 325 MG (65 MG ELEMENTAL FE) SCH MG: 325 (65 FE) TAB at 08:29

## 2020-06-21 RX ADMIN — GABAPENTIN SCH MG: 100 CAPSULE ORAL at 08:34

## 2020-06-21 RX ADMIN — METOPROLOL TARTRATE SCH MG: 50 TABLET, FILM COATED ORAL at 20:13

## 2020-06-21 RX ADMIN — APIXABAN SCH MG: 2.5 TABLET, FILM COATED ORAL at 08:34

## 2020-06-21 RX ADMIN — PROBIOTIC PRODUCT - TAB SCH EA: TAB at 08:35

## 2020-06-21 RX ADMIN — ACETAMINOPHEN SCH MG: 500 TABLET ORAL at 08:34

## 2020-06-21 RX ADMIN — GABAPENTIN SCH MG: 100 CAPSULE ORAL at 20:11

## 2020-06-21 RX ADMIN — MECLIZINE HYDROCHLORIDE SCH MG: 25 TABLET ORAL at 08:34

## 2020-06-21 RX ADMIN — PANTOPRAZOLE SODIUM SCH MG: 40 TABLET, DELAYED RELEASE ORAL at 08:29

## 2020-06-21 RX ADMIN — FERROUS SULFATE TAB 325 MG (65 MG ELEMENTAL FE) SCH MG: 325 (65 FE) TAB at 20:13

## 2020-06-21 RX ADMIN — ATORVASTATIN CALCIUM SCH MG: 20 TABLET, FILM COATED ORAL at 20:13

## 2020-06-21 RX ADMIN — HYDRALAZINE HYDROCHLORIDE SCH MG: 25 TABLET, FILM COATED ORAL at 17:38

## 2020-06-21 RX ADMIN — WHITE PETROLATUM SCH DOSE: 57; 17 PASTE TOPICAL at 17:39

## 2020-06-21 RX ADMIN — POLYETHYLENE GLYCOL 3350 SCH PKT: 17 POWDER, FOR SOLUTION ORAL at 08:28

## 2020-06-21 RX ADMIN — ACETAMINOPHEN SCH MG: 500 TABLET ORAL at 20:12

## 2020-06-21 RX ADMIN — DOCUSATE SODIUM,SENNOSIDES SCH TAB: 50; 8.6 TABLET, FILM COATED ORAL at 08:35

## 2020-06-21 RX ADMIN — METOPROLOL TARTRATE SCH MG: 50 TABLET, FILM COATED ORAL at 08:36

## 2020-06-21 RX ADMIN — MAGNESIUM OXIDE SCH MG: 400 TABLET ORAL at 08:35

## 2020-06-21 RX ADMIN — LIDOCAINE SCH PATCH: 50 PATCH CUTANEOUS at 20:13

## 2020-06-21 RX ADMIN — ACETAMINOPHEN SCH MG: 500 TABLET ORAL at 17:38

## 2020-06-21 RX ADMIN — WHITE PETROLATUM SCH DOSE: 57; 17 PASTE TOPICAL at 20:13

## 2020-06-21 RX ADMIN — HYDRALAZINE HYDROCHLORIDE SCH MG: 25 TABLET, FILM COATED ORAL at 23:38

## 2020-06-21 RX ADMIN — PROBIOTIC PRODUCT - TAB SCH EA: TAB at 11:52

## 2020-06-21 RX ADMIN — PROBIOTIC PRODUCT - TAB SCH EA: TAB at 17:38

## 2020-06-21 RX ADMIN — DOCUSATE SODIUM,SENNOSIDES SCH TAB: 50; 8.6 TABLET, FILM COATED ORAL at 20:11

## 2020-06-22 VITALS — SYSTOLIC BLOOD PRESSURE: 168 MMHG | DIASTOLIC BLOOD PRESSURE: 72 MMHG

## 2020-06-22 VITALS — SYSTOLIC BLOOD PRESSURE: 150 MMHG | DIASTOLIC BLOOD PRESSURE: 62 MMHG

## 2020-06-22 VITALS — DIASTOLIC BLOOD PRESSURE: 69 MMHG | SYSTOLIC BLOOD PRESSURE: 159 MMHG

## 2020-06-22 VITALS — SYSTOLIC BLOOD PRESSURE: 196 MMHG | DIASTOLIC BLOOD PRESSURE: 80 MMHG

## 2020-06-22 LAB
BASOPHILS # BLD AUTO: 0.1 10^3/UL (ref 0–0.2)
BASOPHILS NFR BLD AUTO: 1 % (ref 0–1)
BUN SERPL-MCNC: 17 MG/DL (ref 7–18)
CALCIUM SERPL-MCNC: 9.7 MG/DL (ref 8.8–10.2)
CHLORIDE SERPL-SCNC: 104 MEQ/L (ref 98–107)
CO2 SERPL-SCNC: 24 MEQ/L (ref 21–32)
CREAT SERPL-MCNC: 1 MG/DL (ref 0.55–1.3)
EOSINOPHIL # BLD AUTO: 0.4 10^3/UL (ref 0–0.5)
EOSINOPHIL NFR BLD AUTO: 3.4 % (ref 0–3)
GFR SERPL CREATININE-BSD FRML MDRD: 56.1 ML/MIN/{1.73_M2} (ref 32–?)
GLUCOSE SERPL-MCNC: 96 MG/DL (ref 70–100)
HCT VFR BLD AUTO: 32.8 % (ref 36–47)
HGB BLD-MCNC: 11 G/DL (ref 12–15.5)
LYMPHOCYTES # BLD AUTO: 1.1 10^3/UL (ref 1.5–5)
LYMPHOCYTES NFR BLD AUTO: 8.6 % (ref 24–44)
MCH RBC QN AUTO: 30.3 PG (ref 27–33)
MCHC RBC AUTO-ENTMCNC: 33.5 G/DL (ref 32–36.5)
MCV RBC AUTO: 90.4 FL (ref 80–96)
MONOCYTES # BLD AUTO: 1.3 10^3/UL (ref 0–0.8)
MONOCYTES NFR BLD AUTO: 9.9 % (ref 0–5)
NEUTROPHILS # BLD AUTO: 9.8 10^3/UL (ref 1.5–8.5)
NEUTROPHILS NFR BLD AUTO: 76.9 % (ref 36–66)
PLATELET # BLD AUTO: 449 10^3/UL (ref 150–450)
POTASSIUM SERPL-SCNC: 4.6 MEQ/L (ref 3.5–5.1)
RBC # BLD AUTO: 3.63 10^6/UL (ref 4–5.4)
SODIUM SERPL-SCNC: 134 MEQ/L (ref 136–145)
WBC # BLD AUTO: 12.7 10^3/UL (ref 4–10)

## 2020-06-22 RX ADMIN — MAGNESIUM OXIDE SCH MG: 400 TABLET ORAL at 09:05

## 2020-06-22 RX ADMIN — GABAPENTIN SCH MG: 100 CAPSULE ORAL at 09:06

## 2020-06-22 RX ADMIN — PROBIOTIC PRODUCT - TAB SCH EA: TAB at 12:49

## 2020-06-22 RX ADMIN — PANTOPRAZOLE SODIUM SCH MG: 40 TABLET, DELAYED RELEASE ORAL at 09:03

## 2020-06-22 RX ADMIN — APIXABAN SCH MG: 2.5 TABLET, FILM COATED ORAL at 21:03

## 2020-06-22 RX ADMIN — MECLIZINE HYDROCHLORIDE SCH MG: 25 TABLET ORAL at 15:18

## 2020-06-22 RX ADMIN — FERROUS SULFATE TAB 325 MG (65 MG ELEMENTAL FE) SCH MG: 325 (65 FE) TAB at 21:03

## 2020-06-22 RX ADMIN — HYDRALAZINE HYDROCHLORIDE SCH MG: 25 TABLET, FILM COATED ORAL at 05:12

## 2020-06-22 RX ADMIN — PROBIOTIC PRODUCT - TAB SCH EA: TAB at 09:06

## 2020-06-22 RX ADMIN — WHITE PETROLATUM SCH DOSE: 57; 17 PASTE TOPICAL at 09:16

## 2020-06-22 RX ADMIN — POLYETHYLENE GLYCOL 3350 SCH PKT: 17 POWDER, FOR SOLUTION ORAL at 09:00

## 2020-06-22 RX ADMIN — DOCUSATE SODIUM,SENNOSIDES SCH TAB: 50; 8.6 TABLET, FILM COATED ORAL at 09:06

## 2020-06-22 RX ADMIN — MECLIZINE HYDROCHLORIDE SCH MG: 25 TABLET ORAL at 09:05

## 2020-06-22 RX ADMIN — PROBIOTIC PRODUCT - TAB SCH EA: TAB at 17:17

## 2020-06-22 RX ADMIN — ACETAMINOPHEN SCH MG: 500 TABLET ORAL at 09:05

## 2020-06-22 RX ADMIN — GABAPENTIN SCH MG: 300 CAPSULE ORAL at 15:18

## 2020-06-22 RX ADMIN — Medication SCH: at 09:07

## 2020-06-22 RX ADMIN — ACETAMINOPHEN SCH MG: 500 TABLET ORAL at 21:02

## 2020-06-22 RX ADMIN — LEVOTHYROXINE SODIUM SCH MCG: 75 TABLET ORAL at 05:42

## 2020-06-22 RX ADMIN — DOCUSATE SODIUM,SENNOSIDES SCH TAB: 50; 8.6 TABLET, FILM COATED ORAL at 21:03

## 2020-06-22 RX ADMIN — MECLIZINE HYDROCHLORIDE SCH MG: 25 TABLET ORAL at 21:03

## 2020-06-22 RX ADMIN — HYDRALAZINE HYDROCHLORIDE SCH MG: 25 TABLET, FILM COATED ORAL at 17:18

## 2020-06-22 RX ADMIN — ACETAMINOPHEN SCH MG: 500 TABLET ORAL at 15:19

## 2020-06-22 RX ADMIN — DULOXETINE HYDROCHLORIDE SCH MG: 30 CAPSULE, DELAYED RELEASE ORAL at 09:04

## 2020-06-22 RX ADMIN — FERROUS SULFATE TAB 325 MG (65 MG ELEMENTAL FE) SCH MG: 325 (65 FE) TAB at 09:05

## 2020-06-22 RX ADMIN — ATORVASTATIN CALCIUM SCH MG: 20 TABLET, FILM COATED ORAL at 21:03

## 2020-06-22 RX ADMIN — METOPROLOL TARTRATE SCH MG: 50 TABLET, FILM COATED ORAL at 09:04

## 2020-06-22 RX ADMIN — APIXABAN SCH MG: 2.5 TABLET, FILM COATED ORAL at 09:06

## 2020-06-22 RX ADMIN — WHITE PETROLATUM SCH DOSE: 57; 17 PASTE TOPICAL at 21:05

## 2020-06-22 RX ADMIN — WHITE PETROLATUM SCH DOSE: 57; 17 PASTE TOPICAL at 15:24

## 2020-06-22 RX ADMIN — GABAPENTIN SCH MG: 300 CAPSULE ORAL at 21:03

## 2020-06-22 RX ADMIN — METOPROLOL TARTRATE SCH MG: 50 TABLET, FILM COATED ORAL at 21:03

## 2020-06-22 RX ADMIN — LIDOCAINE SCH PATCH: 50 PATCH CUTANEOUS at 21:05

## 2020-06-22 RX ADMIN — HYDRALAZINE HYDROCHLORIDE SCH MG: 25 TABLET, FILM COATED ORAL at 12:49

## 2020-06-22 NOTE — IPNPDOC
PM&R Progress Note


DATE OF SERVICE:  Jun 18, 2020


Physiatrist Progress Note


Subjective:


Patient notified her blood pressures have been slightly elevated, but she denies

chest pain or dizziness and understands she will start a new medication to bring

them down. She states her pain is overall well controlled. 








REVIEW OF SYSTEMS: The following is a completed review of systems and has been 

reviewed. Review of systems otherwise unremarkable.


PAIN: Patient self reports right hip pain


EYES: No recent vision changes


EARS, NOSE, & THROAT:+intermittent dysphagia solids and liquids (chronic)


CARDIOVASCULAR: Denies chest pain or palpitations


PULMONARY: Denies shortness of breath


GASTROINTESTINAL: +constipation (resolved)


GENITOURINARY:denies dysuria


MUSCULOSKELETAL: right hip fracture


NEUROLOGICAL:denies paresthesias


HEMATOLOGICAL: denies easy bruising, +anemia (improving)


SKIN:right hip incision


PSYCHIATRIC: Unremarkable


All other review of systems found to be negative.








PHYSICAL EXAMINATION:


VITAL SIGNS: Please see below.


GENERAL: Pleasant and cooperative. No acute distress. 


HEENT: PERRL. Extraocular movements intact. Clear conjunctiva


CARDIOVASCULAR: Regular rate and rhythm. No murmurs, rubs, or gallops


LUNGS: Clear to auscultation bilaterally. No wheezes. No rhonchi


ABDOMEN: Soft, nontender, nondistended. Positive bowel sounds. Normal active 

bowel sounds


NEUROLOGICAL: Alert and oriented times three. Cranial nerves II through XII 

grossly intact. Sensation grossly intact


EXTREMITIES: 5\5 strength bilateral upper extremities. 4+\5 strength right ankle

DF/EHL and PF (limited due to surgery) 4+/5 strength in left lower extremity. 





SKIN: right hip incision, blanchable sacral erythema











ASSESSMENT:85-year-old F with past medical history of Afib who presents status 

post fall with right femur fracture





PLAN:


1. PT/OT- advance gait and ADLs, strengthen/stretch/maintain ROM all4 limbs-

ambulating with RW


-SLP- eval for swallow, patient endorsing intermittent dysphagia- change diet to

level 2 


2. Ortho- s/p right femur fracture and ORIF, ortho consulted- WBAT


-right medial foot pain reported on inpatient with follow-up Xray negative for 

gross fracture, however due to bone demineralization a small fracture was unable

to be ruled out-ortho consulted, recs appreciated, c/u WBAT


3. Cardiac: Afib s/p ablation on eliquis and metoprolol


-HTN c/u amlodipine,  Micardis on hold due to ALVERTO which is improving,  will 

start hydralazine for elevated BPs- medicine consulted to assist in overall 

management


- CAD c/u ASA


-HLD c/u statin


4. Resp: c/u supplemental 02, encourgae incentive spirometry


5. Neuro: chronic vertigo c/u meclizine


6. Renal- hx of CKD with new ALVERTO improving following s/p blood transfusion 


7. Heme: post-op anemia, s/p 1 unit rbcs, improving 


-patient had some blood per rectum 6-10-20  while attempting to have a bowel 

movement- resolved and no drop in Hgb 


-c/u iron


8. DVT ppx: c/u eliquis


-admission Doppler negative for DVT


9. GI ppx: Protonix


- c/u  bowel meds


10. : hx of incontinence, now with retention, holding Ditropan, UA + for LE 

and bacteria however Ucx negative, s/p 3 day course of levaquin 


11. Pain: Tylenol, oxycodone, and gabapentin 


-c/u cymbalta effective for pain 


-c/u Lidoderm patch to right groin qHS


12. Endo: hypothyroidism- c/u synthroid


13. Psych-c/u buspar 5mg daily and cymbalta 


14. Hyponatremia- fluid restriction and monitor, no concerning neuro/GI symptoms


15. Dispo: 6-23-20 to home, however given she lives alone with little support 

will likely need to extend her stay with goal to Home


Allergies


Coded Allergies:  


     Penicillins (Unverified  Allergy, Unknown, 6/5/20)


     latex (Unverified  Allergy, Unknown, 6/5/20)


     morphine (Unverified  Allergy, Unknown, 6/7/20)


   


   CONFIRMED DOSES ON 6/5 NO REACTION





Vital Signs





Vital Signs








  Date Time  Temp Pulse Resp B/P (MAP) Pulse Ox O2 Delivery O2 Flow Rate FiO2


 


6/22/20 09:04  65  130/70    


 


6/22/20 06:54   18     


 


6/22/20 04:56 98.2    95 Room Air  











Current Medications


Current Medications





Current Medications








 Medications


  (Trade)  Dose


 Ordered  Sig/Carlos


 Route


 PRN Reason  Start Time


 Stop Time Status Last Admin


Dose Admin


 


 Acetaminophen


  (Tylenol Tab)  1,000 mg  Q8H


 PO


   6/8/20 22:00


 6/9/20 15:16 DC 6/9/20 13:20





 


 Acetaminophen


  (Tylenol Tab)  1,000 mg  TID


 PO


   6/9/20 21:00


    6/22/20 09:05





 


 Amlodipine


 Besylate


  (Norvasc)  5 mg  BID


 PO


   6/8/20 21:00


    6/22/20 09:04





 


 Apixaban


  (Eliquis)  2.5 mg  BID


 PO


   6/8/20 21:00


    6/22/20 09:06





 


 Atorvastatin


 Calcium


  (Lipitor)  20 mg  QHS


 PO


   6/8/20 21:00


    6/21/20 20:13





 


 Bisacodyl


  (Dulcolax


 Suppository)  10 mg  DAILYPRN  PRN


 ID


 CONSTIPATION  6/10/20 13:00


 6/10/20 12:53 DC  





 


 Bisacodyl


  (Dulcolax


 Suppository)  10 mg  DAILYPRN  PRN


 ID


 CONSTIPATION  6/8/20 14:45


     





 


 Buspirone HCl


  (Buspar)  5 mg  BID


 PO


   6/8/20 21:00


 6/10/20 13:38 DC 6/10/20 09:04





 


 Buspirone HCl


  (Buspar)  5 mg  DAILY


 PO


   6/11/20 09:00


    6/22/20 09:05





 


 Duloxetine HCl


  (Cymbalta)  30 mg  DAILY


 PO


   6/10/20 09:00


    6/22/20 09:04





 


 Ferrous Sulfate


  (Ferrous Sulfate)  325 mg  BID


 PO


   6/9/20 21:00


    6/22/20 09:05





 


 Gabapentin


  (Neurontin)  100 mg  BID


 PO


   6/8/20 21:00


 6/21/20 08:42 DC 6/21/20 08:34





 


 Gabapentin


  (Neurontin)  200 mg  BID


 PO


   6/21/20 21:00


    6/22/20 09:06





 


 Hydralazine HCl


  (Apresoline)  25 mg  Q6H


 PO


   6/18/20 12:00


    6/21/20 23:38





 


 Hydrocortisone


  (Hydrocortisone


 2.5% Ointment)  right


 antecubital


 area  BID  PRN


 TOP


 itch  6/16/20 14:00


    6/16/20 16:02





 


 Lactobacillus


 Acidophilus


  (Bacid)  1 ea  WM


 PO


   6/10/20 12:30


    6/22/20 09:06





 


 Levofloxacin


  (Levaquin)  250 mg  DAILY@06


 PO


   6/10/20 06:00


 6/14/20 04:37 DC 6/13/20 05:50





 


 Levothyroxine


 Sodium


  (Synthroid)  75 mcg  DAILY@06


 PO


   6/9/20 06:00


    6/22/20 05:42





 


 Lidocaine


  (Lidoderm Patch)  1 patch  QHS


 TD


   6/17/20 21:00


    6/21/20 20:13





 


 Magnesium Oxide


  (Mag-Ox)  400 mg  DAILY


 PO


   6/9/20 09:00


    6/22/20 09:05





 


 Meclizine HCl


  (Antivert)  25 mg  TID


 PO


   6/8/20 16:00


    6/22/20 09:05





 


 Metoprolol


 Tartrate


  (Lopressor)  50 mg  BID


 PO


   6/8/20 21:00


 6/22/20 10:17 DC 6/22/20 09:04





 


 Metoprolol


 Tartrate


  (Lopressor)  50 mg  Q8H


 PO


   6/22/20 17:00


   UNV  





 


 Miscellaneous


  (Unresolved


 Clarification


 Entry)  SEE LABEL


 COMMENTS  DAILY


 XX


   6/20/20 09:00


 6/21/20 11:02 DC  





 


 Non-Formulary


 Medication


  (** See Comment


 Field Below **)  REMOVE


 LIDODERM


 PATCH  DAILY@0900


 XX


   6/18/20 09:00


    6/22/20 09:07





 


 Ondansetron HCl


  (Zofran Odt)  4 mg  Q4HP  PRN


 PO


 NAUSEA OR VOMITING  6/8/20 14:45


    6/15/20 21:19





 


 Oxybutynin


 Chloride


  (Ditropan)  5 mg  BID


 PO


   6/8/20 21:00


 6/9/20 13:50 DC 6/9/20 08:22





 


 Oxycodone HCl


  (Roxicodone,


 Oxyir)  5 mg  Q4HP  PRN


 PO


 PAIN  6/8/20 14:45


    6/22/20 06:14





 


 Pantoprazole


 Sodium


  (Protonix)  40 mg  DAILY


 PO


   6/9/20 09:00


    6/22/20 09:03





 


 Polyethylene


 Glycol


  (Miralax)  1 pkt  DAILY


 PO


   6/10/20 09:00


    6/21/20 08:28





 


 Senna/Docusate


 Sodium


  (Senokot S)  2 tab  BID


 PO


   6/8/20 21:00


    6/22/20 09:06





 


 Sodium


 Biphosphate/


 Sodium Phosphate


  (Fleet Enema)  1 ea  DAILYPRN  PRN


 ID


 CONSTIPATION  6/10/20 13:00


    6/10/20 21:49





 


 Telmisartan


  (Micardis)  20 mg  QHS


 PO


   6/8/20 21:00


 6/9/20 10:25 DC 6/8/20 21:06




















FARAZ SHIN MD         Jun 22, 2020 10:26

## 2020-06-22 NOTE — IPNPDOC
PM&R Progress Note


DATE OF SERVICE:  Jun 17, 2020


Physiatrist Progress Note


Subjective:


Patient reporting she is having bowel movements and her pain is starting to feel

more controlled since initiating Cymbalta.








REVIEW OF SYSTEMS: The following is a completed review of systems and has been 

reviewed. Review of systems otherwise unremarkable.


PAIN: Patient self reports right hip pain


EYES: No recent vision changes


EARS, NOSE, & THROAT:+intermittent dysphagia solids and liquids (chronic)


CARDIOVASCULAR: Denies chest pain or palpitations


PULMONARY: Denies shortness of breath


GASTROINTESTINAL: +constipation (resolved)


GENITOURINARY:denies dysuria


MUSCULOSKELETAL: right hip fracture


NEUROLOGICAL:denies paresthesias


HEMATOLOGICAL: denies easy bruising, +anemia (improving)


SKIN:right hip incision


PSYCHIATRIC: Unremarkable


All other review of systems found to be negative.








PHYSICAL EXAMINATION:


VITAL SIGNS: Please see below.


GENERAL: Pleasant and cooperative. No acute distress. 


HEENT: PERRL. Extraocular movements intact. Clear conjunctiva


CARDIOVASCULAR: Regular rate and rhythm. No murmurs, rubs, or gallops


LUNGS: Clear to auscultation bilaterally. No wheezes. No rhonchi


ABDOMEN: Soft, nontender, nondistended. Positive bowel sounds. Normal active 

bowel sounds


NEUROLOGICAL: Alert and oriented times three. Cranial nerves II through XII 

grossly intact. Sensation grossly intact


EXTREMITIES: 5\5 strength bilateral upper extremities. 4+\5 strength right ankle

DF/EHL and PF (limited due to surgery) 4+/5 strength in left lower extremity. 





SKIN: right hip incision, blanchable sacral erythema











ASSESSMENT:85-year-old F with past medical history of Afib who presents status 

post fall with right femur fracture





PLAN:


1. PT/OT- advance gait and ADLs, strengthen/stretch/maintain ROM all4 limbs-

ambulating with RW


-SLP- eval for swallow, patient endorsing intermittent dysphagia- change diet to

level 2 


2. Ortho- s/p right femur fracture and ORIF, ortho consulted- WBAT


-right medial foot pain reported on inpatient with follow-up Xray negative for 

gross fracture, however due to bone demineralization a small fracture was unable

to be ruled out-ortho consulted, recs appreciated, c/u WBAT


3. Cardiac: Afib s/p ablation on eliquis and metoprolol


-HTN c/u amlodipine,  Micardis on hold due to ALVERTO which is improving,  medicine 

consulted to assist in overall management


- CAD c/u ASA


-HLD c/u statin


4. Resp: c/u supplemental 02, encourgae incentive spirometry


5. Neuro: chronic vertigo c/u meclizine


6. Renal- hx of CKD with new ALVERTO improving following s/p blood transfusion 


7. Heme: post-op anemia, s/p 1 unit rbcs, improving 


-patient had some blood per rectum 6-10-20  while attempting to have a bowel 

movement- resolved and no drop in Hgb 


-c/u iron


8. DVT ppx: c/u eliquis


-admission Doppler negative for DVT


9. GI ppx: Protonix


- c/u  bowel meds


10. : hx of incontinence, now with retention, holding Ditropan, UA + for LE 

and bacteria however Ucx negative, s/p 3 day course of levaquin 


11. Pain: Tylenol, oxycodone, and gabapentin 


-c/u cymbalta effective for pain 


-will start Lidoderm patch to right groin qHS


12. Endo: hypothyroidism- c/u synthroid


13. Psych-c/u buspar 5mg daily and cymbalta 


14. Hyponatremia- fluid restriction and monitor, no concerning neuro/GI symptoms


15. Dispo: 6-23-20 to home, progressing towards goals


Allergies


Coded Allergies:  


     Penicillins (Unverified  Allergy, Unknown, 6/5/20)


     latex (Unverified  Allergy, Unknown, 6/5/20)


     morphine (Unverified  Allergy, Unknown, 6/7/20)


   


   CONFIRMED DOSES ON 6/5 NO REACTION





Vital Signs





Vital Signs








  Date Time  Temp Pulse Resp B/P (MAP) Pulse Ox O2 Delivery O2 Flow Rate FiO2


 


6/22/20 09:04  65  130/70    


 


6/22/20 06:54   18     


 


6/22/20 04:56 98.2    95 Room Air  











Current Medications


Current Medications





Current Medications








 Medications


  (Trade)  Dose


 Ordered  Sig/Carlos


 Route


 PRN Reason  Start Time


 Stop Time Status Last Admin


Dose Admin


 


 Acetaminophen


  (Tylenol Tab)  1,000 mg  Q8H


 PO


   6/8/20 22:00


 6/9/20 15:16 DC 6/9/20 13:20





 


 Acetaminophen


  (Tylenol Tab)  1,000 mg  TID


 PO


   6/9/20 21:00


    6/22/20 09:05





 


 Amlodipine


 Besylate


  (Norvasc)  5 mg  BID


 PO


   6/8/20 21:00


    6/22/20 09:04





 


 Apixaban


  (Eliquis)  2.5 mg  BID


 PO


   6/8/20 21:00


    6/22/20 09:06





 


 Atorvastatin


 Calcium


  (Lipitor)  20 mg  QHS


 PO


   6/8/20 21:00


    6/21/20 20:13





 


 Bisacodyl


  (Dulcolax


 Suppository)  10 mg  DAILYPRN  PRN


 ID


 CONSTIPATION  6/10/20 13:00


 6/10/20 12:53 DC  





 


 Bisacodyl


  (Dulcolax


 Suppository)  10 mg  DAILYPRN  PRN


 ID


 CONSTIPATION  6/8/20 14:45


     





 


 Buspirone HCl


  (Buspar)  5 mg  BID


 PO


   6/8/20 21:00


 6/10/20 13:38 DC 6/10/20 09:04





 


 Buspirone HCl


  (Buspar)  5 mg  DAILY


 PO


   6/11/20 09:00


    6/22/20 09:05





 


 Duloxetine HCl


  (Cymbalta)  30 mg  DAILY


 PO


   6/10/20 09:00


    6/22/20 09:04





 


 Ferrous Sulfate


  (Ferrous Sulfate)  325 mg  BID


 PO


   6/9/20 21:00


    6/22/20 09:05





 


 Gabapentin


  (Neurontin)  100 mg  BID


 PO


   6/8/20 21:00


 6/21/20 08:42 DC 6/21/20 08:34





 


 Gabapentin


  (Neurontin)  200 mg  BID


 PO


   6/21/20 21:00


    6/22/20 09:06





 


 Hydralazine HCl


  (Apresoline)  25 mg  Q6H


 PO


   6/18/20 12:00


    6/21/20 23:38





 


 Hydrocortisone


  (Hydrocortisone


 2.5% Ointment)  right


 antecubital


 area  BID  PRN


 TOP


 itch  6/16/20 14:00


    6/16/20 16:02





 


 Lactobacillus


 Acidophilus


  (Bacid)  1 ea  WM


 PO


   6/10/20 12:30


    6/22/20 09:06





 


 Levofloxacin


  (Levaquin)  250 mg  DAILY@06


 PO


   6/10/20 06:00


 6/14/20 04:37 DC 6/13/20 05:50





 


 Levothyroxine


 Sodium


  (Synthroid)  75 mcg  DAILY@06


 PO


   6/9/20 06:00


    6/22/20 05:42





 


 Lidocaine


  (Lidoderm Patch)  1 patch  QHS


 TD


   6/17/20 21:00


    6/21/20 20:13





 


 Magnesium Oxide


  (Mag-Ox)  400 mg  DAILY


 PO


   6/9/20 09:00


    6/22/20 09:05





 


 Meclizine HCl


  (Antivert)  25 mg  TID


 PO


   6/8/20 16:00


    6/22/20 09:05





 


 Metoprolol


 Tartrate


  (Lopressor)  50 mg  BID


 PO


   6/8/20 21:00


 6/22/20 10:17 DC 6/22/20 09:04





 


 Metoprolol


 Tartrate


  (Lopressor)  50 mg  Q8H


 PO


   6/22/20 17:00


   UNV  





 


 Miscellaneous


  (Unresolved


 Clarification


 Entry)  SEE LABEL


 COMMENTS  DAILY


 XX


   6/20/20 09:00


 6/21/20 11:02 DC  





 


 Non-Formulary


 Medication


  (** See Comment


 Field Below **)  REMOVE


 LIDODERM


 PATCH  DAILY@0900


 XX


   6/18/20 09:00


    6/22/20 09:07





 


 Ondansetron HCl


  (Zofran Odt)  4 mg  Q4HP  PRN


 PO


 NAUSEA OR VOMITING  6/8/20 14:45


    6/15/20 21:19





 


 Oxybutynin


 Chloride


  (Ditropan)  5 mg  BID


 PO


   6/8/20 21:00


 6/9/20 13:50 DC 6/9/20 08:22





 


 Oxycodone HCl


  (Roxicodone,


 Oxyir)  5 mg  Q4HP  PRN


 PO


 PAIN  6/8/20 14:45


    6/22/20 06:14





 


 Pantoprazole


 Sodium


  (Protonix)  40 mg  DAILY


 PO


   6/9/20 09:00


    6/22/20 09:03





 


 Polyethylene


 Glycol


  (Miralax)  1 pkt  DAILY


 PO


   6/10/20 09:00


    6/21/20 08:28





 


 Senna/Docusate


 Sodium


  (Senokot S)  2 tab  BID


 PO


   6/8/20 21:00


    6/22/20 09:06





 


 Sodium


 Biphosphate/


 Sodium Phosphate


  (Fleet Enema)  1 ea  DAILYPRN  PRN


 ID


 CONSTIPATION  6/10/20 13:00


    6/10/20 21:49





 


 Telmisartan


  (Micardis)  20 mg  QHS


 PO


   6/8/20 21:00


 6/9/20 10:25 DC 6/8/20 21:06




















KIRK-RYAN,FARAZ MD         Jun 22, 2020 10:23

## 2020-06-22 NOTE — IPNPDOC
PM&R Progress Note


DATE OF SERVICE:  Jun 22, 2020


Physiatrist Progress Note


Subjective:


Patient stating her right hip is really bothering her today, but seems to 

improve with the pain medication she received this morning. She would like to 

schedule her oxycodone.





REVIEW OF SYSTEMS: The following is a completed review of systems and has been 

reviewed. Review of systems otherwise unremarkable.


PAIN: Patient self reports right hip pain


EYES: No recent vision changes


EARS, NOSE, & THROAT:+intermittent dysphagia solids and liquids (chronic)


CARDIOVASCULAR: Denies chest pain or palpitations


PULMONARY: Denies shortness of breath


GASTROINTESTINAL: +constipation (resolved)


GENITOURINARY:denies dysuria


MUSCULOSKELETAL: right hip fracture


NEUROLOGICAL:denies paresthesias


HEMATOLOGICAL: denies easy bruising, +anemia (improving)


SKIN:right hip incision


PSYCHIATRIC: Unremarkable


All other review of systems found to be negative.








PHYSICAL EXAMINATION:


VITAL SIGNS: Please see below.


GENERAL: Pleasant and cooperative. No acute distress. 


HEENT: PERRL. Extraocular movements intact. Clear conjunctiva


CARDIOVASCULAR: Regular rate and rhythm. No murmurs, rubs, or gallops


LUNGS: Clear to auscultation bilaterally. No wheezes. No rhonchi


ABDOMEN: Soft, nontender, nondistended. Positive bowel sounds. Normal active 

bowel sounds


NEUROLOGICAL: Alert and oriented times three. Cranial nerves II through XII 

grossly intact. Sensation grossly intact


EXTREMITIES: 5\5 strength bilateral upper extremities. 4+\5 strength right ankle

DF/EHL and PF (limited due to surgery) 4+/5 strength in left lower extremity. 





SKIN: right hip incision, blanchable sacral erythema











ASSESSMENT:85-year-old F with past medical history of Afib who presents status 

post fall with right femur fracture





PLAN:


1. PT/OT- advance gait and ADLs, strengthen/stretch/maintain ROM all4 limbs-

ambulating with RW


-SLP- eval for swallow, patient endorsing intermittent dysphagia- change diet to

level 2 


2. Ortho- s/p right femur fracture and ORIF, ortho consulted- WBAT


-right medial foot pain reported on inpatient with follow-up Xray negative for 

gross fracture, however due to bone demineralization a small fracture was unable

to be ruled out-ortho consulted, recs appreciated, c/u WBAT


3. Cardiac: Afib s/p ablation on eliquis and metoprolol


-HTN c/u amlodipine,  Micardis on hold due to ALVERTO,  c/u hydralazine for elevated

BPs and will increase metoprolol to 50mg q8h - medicine consulted to assist in 

overall management


- CAD c/u ASA


-HLD c/u statin


4. Resp: c/u supplemental 02, encourgae incentive spirometry


5. Neuro: chronic vertigo c/u meclizine


6. Renal- hx of CKD with new ALVERTO resolved following s/p blood transfusion 


7. Heme: post-op anemia, s/p 1 unit rbcs, improving 


-patient had some blood per rectum 6-10-20  while attempting to have a bowel 

movement- resolved and no drop in Hgb 


-c/u iron


8. DVT ppx: c/u eliquis


-admission Doppler negative for DVT, will repeat to due increased right leg pain


9. GI ppx: Protonix


- c/u  bowel meds


10. : hx of incontinence, now with retention, holding Ditropan, UA + for LE 

and bacteria however Ucx negative, s/p 3 day course of levaquin 


11. Pain: Tylenol, oxycodone 9will schedule for better pain control), and 

gabapentin (increase to 300mg tid)


-increase cymbalta  to 40mg daily


-c/u Lidoderm patch to right groin qHS


12. Endo: hypothyroidism- c/u synthroid


13. Psych-c/u buspar 5mg daily and cymbalta 


14. Hyponatremia- fluid restriction and monitor, no concerning neuro/GI symptoms


15. Leukocytosis- no fever, denies dysuria or cough, likely still inflammatory 

from recent surgery, will monitor


15. Dispo: 6-23-20 to home, however given she lives alone with little support 

will likely need to extend her stay with goal to Home


Allergies


Coded Allergies:  


     Penicillins (Unverified  Allergy, Unknown, 6/5/20)


     latex (Unverified  Allergy, Unknown, 6/5/20)


     morphine (Unverified  Allergy, Unknown, 6/7/20)


   


   CONFIRMED DOSES ON 6/5 NO REACTION





Vital Signs





Vital Signs








  Date Time  Temp Pulse Resp B/P (MAP) Pulse Ox O2 Delivery O2 Flow Rate FiO2


 


6/22/20 12:49    146/66    


 


6/22/20 09:04  65      


 


6/22/20 06:54   18     


 


6/22/20 04:56 98.2    95 Room Air  











Laboratory Data


CBC/BMP


Laboratory Tests


6/22/20 12:28








Labs 24H


Laboratory Tests 2


6/22/20 12:28: 


Immature Granulocyte % (Auto) 0.2, Neutrophils (%) (Auto) 76.9H, Lymphocytes (%)

(Auto) 8.6L, Monocytes (%) (Auto) 9.9H, Eosinophils (%) (Auto) 3.4H, Basophils 

(%) (Auto) 1.0, Neutrophils # (Auto) 9.8H, Lymphocytes # (Auto) 1.1L, Monocytes 

# (Auto) 1.3H, Eosinophils # (Auto) 0.4, Basophils # (Auto) 0.1, Nucleated Red 

Blood Cells % (auto) 0.0, Anion Gap 6L, Glomerular Filtration Rate 56.1, Calcium

Level 9.7





Current Medications


Current Medications





Current Medications








 Medications


  (Trade)  Dose


 Ordered  Sig/Carlos


 Route


 PRN Reason  Start Time


 Stop Time Status Last Admin


Dose Admin


 


 Acetaminophen


  (Tylenol Tab)  1,000 mg  Q8H


 PO


   6/8/20 22:00


 6/9/20 15:16 DC 6/9/20 13:20





 


 Acetaminophen


  (Tylenol Tab)  1,000 mg  TID


 PO


   6/9/20 21:00


    6/22/20 09:05





 


 Amlodipine


 Besylate


  (Norvasc)  5 mg  BID


 PO


   6/8/20 21:00


    6/22/20 09:04





 


 Apixaban


  (Eliquis)  2.5 mg  BID


 PO


   6/8/20 21:00


    6/22/20 09:06





 


 Atorvastatin


 Calcium


  (Lipitor)  20 mg  QHS


 PO


   6/8/20 21:00


    6/21/20 20:13





 


 Bisacodyl


  (Dulcolax


 Suppository)  10 mg  DAILYPRN  PRN


 [place]


 CONSTIPATION  6/10/20 13:00


 6/10/20 12:53 DC  





 


 Bisacodyl


  (Dulcolax


 Suppository)  10 mg  DAILYPRN  PRN


 DE


 CONSTIPATION  6/8/20 14:45


     





 


 Buspirone HCl


  (Buspar)  5 mg  BID


 PO


   6/8/20 21:00


 6/10/20 13:38 DC 6/10/20 09:04





 


 Buspirone HCl


  (Buspar)  5 mg  DAILY


 PO


   6/11/20 09:00


    6/22/20 09:05





 


 Duloxetine HCl


  (Cymbalta)  30 mg  DAILY


 PO


   6/10/20 09:00


    6/22/20 09:04





 


 Ferrous Sulfate


  (Ferrous Sulfate)  325 mg  BID


 PO


   6/9/20 21:00


    6/22/20 09:05





 


 Gabapentin


  (Neurontin)  100 mg  BID


 PO


   6/8/20 21:00


 6/21/20 08:42 DC 6/21/20 08:34





 


 Gabapentin


  (Neurontin)  200 mg  BID


 PO


   6/21/20 21:00


    6/22/20 09:06





 


 Hydralazine HCl


  (Apresoline)  25 mg  Q6H


 PO


   6/18/20 12:00


    6/22/20 12:49





 


 Hydrocortisone


  (Hydrocortisone


 2.5% Ointment)  right


 antecubital


 area  BID  PRN


 TOP


 itch  6/16/20 14:00


    6/16/20 16:02





 


 Lactobacillus


 Acidophilus


  (Bacid)  1 ea  WM


 PO


   6/10/20 12:30


    6/22/20 12:49





 


 Levofloxacin


  (Levaquin)  250 mg  DAILY@06


 PO


   6/10/20 06:00


 6/14/20 04:37 DC 6/13/20 05:50





 


 Levothyroxine


 Sodium


  (Synthroid)  75 mcg  DAILY@06


 PO


   6/9/20 06:00


    6/22/20 05:42





 


 Lidocaine


  (Lidoderm Patch)  1 patch  QHS


 TD


   6/17/20 21:00


    6/21/20 20:13





 


 Magnesium Oxide


  (Mag-Ox)  400 mg  DAILY


 PO


   6/9/20 09:00


    6/22/20 09:05





 


 Meclizine HCl


  (Antivert)  25 mg  TID


 PO


   6/8/20 16:00


    6/22/20 09:05





 


 Metoprolol


 Tartrate


  (Lopressor)  50 mg  BID


 PO


   6/8/20 21:00


 6/22/20 10:17 DC 6/22/20 09:04





 


 Metoprolol


 Tartrate


  (Lopressor)  50 mg  Q8H


 PO


   6/22/20 22:00


     





 


 Miscellaneous


  (Unresolved


 Clarification


 Entry)  SEE LABEL


 COMMENTS  DAILY


 XX


   6/20/20 09:00


 6/21/20 11:02 DC  





 


 Non-Formulary


 Medication


  (** See Comment


 Field Below **)  REMOVE


 LIDODERM


 PATCH  DAILY@0900


 XX


   6/18/20 09:00


    6/22/20 09:07





 


 Ondansetron HCl


  (Zofran Odt)  4 mg  Q4HP  PRN


 PO


 NAUSEA OR VOMITING  6/8/20 14:45


    6/15/20 21:19





 


 Oxybutynin


 Chloride


  (Ditropan)  5 mg  BID


 PO


   6/8/20 21:00


 6/9/20 13:50 DC 6/9/20 08:22





 


 Oxycodone HCl


  (Roxicodone,


 Oxyir)  5 mg  Q4HP  PRN


 PO


 PAIN  6/8/20 14:45


    6/22/20 06:14





 


 Pantoprazole


 Sodium


  (Protonix)  40 mg  DAILY


 PO


   6/9/20 09:00


    6/22/20 09:03





 


 Polyethylene


 Glycol


  (Miralax)  1 pkt  DAILY


 PO


   6/10/20 09:00


    6/21/20 08:28





 


 Senna/Docusate


 Sodium


  (Senokot S)  2 tab  BID


 PO


   6/8/20 21:00


    6/22/20 09:06





 


 Sodium


 Biphosphate/


 Sodium Phosphate


  (Fleet Enema)  1 ea  DAILYPRN  PRN


 [place]


 CONSTIPATION  6/10/20 13:00


    6/10/20 21:49





 


 Telmisartan


  (Micardis)  20 mg  QHS


 PO


   6/8/20 21:00


 6/9/20 10:25 DC 6/8/20 21:06




















FARAZ SHIN MD         Jun 22, 2020 13:59

## 2020-06-23 VITALS — SYSTOLIC BLOOD PRESSURE: 146 MMHG | DIASTOLIC BLOOD PRESSURE: 63 MMHG

## 2020-06-23 VITALS — DIASTOLIC BLOOD PRESSURE: 60 MMHG | SYSTOLIC BLOOD PRESSURE: 123 MMHG

## 2020-06-23 VITALS — DIASTOLIC BLOOD PRESSURE: 68 MMHG | SYSTOLIC BLOOD PRESSURE: 156 MMHG

## 2020-06-23 VITALS — SYSTOLIC BLOOD PRESSURE: 152 MMHG | DIASTOLIC BLOOD PRESSURE: 70 MMHG

## 2020-06-23 VITALS — DIASTOLIC BLOOD PRESSURE: 69 MMHG | SYSTOLIC BLOOD PRESSURE: 157 MMHG

## 2020-06-23 VITALS — SYSTOLIC BLOOD PRESSURE: 160 MMHG | DIASTOLIC BLOOD PRESSURE: 72 MMHG

## 2020-06-23 VITALS — SYSTOLIC BLOOD PRESSURE: 152 MMHG | DIASTOLIC BLOOD PRESSURE: 82 MMHG

## 2020-06-23 RX ADMIN — MECLIZINE HYDROCHLORIDE SCH MG: 25 TABLET ORAL at 08:06

## 2020-06-23 RX ADMIN — ACETAMINOPHEN SCH MG: 500 TABLET ORAL at 08:06

## 2020-06-23 RX ADMIN — GABAPENTIN SCH MG: 300 CAPSULE ORAL at 20:54

## 2020-06-23 RX ADMIN — METOPROLOL TARTRATE SCH MG: 50 TABLET, FILM COATED ORAL at 20:51

## 2020-06-23 RX ADMIN — APIXABAN SCH MG: 2.5 TABLET, FILM COATED ORAL at 20:53

## 2020-06-23 RX ADMIN — PANTOPRAZOLE SODIUM SCH MG: 40 TABLET, DELAYED RELEASE ORAL at 08:06

## 2020-06-23 RX ADMIN — DULOXETINE SCH MG: 20 CAPSULE, DELAYED RELEASE ORAL at 08:04

## 2020-06-23 RX ADMIN — FERROUS SULFATE TAB 325 MG (65 MG ELEMENTAL FE) SCH MG: 325 (65 FE) TAB at 08:04

## 2020-06-23 RX ADMIN — POLYETHYLENE GLYCOL 3350 SCH PKT: 17 POWDER, FOR SOLUTION ORAL at 08:06

## 2020-06-23 RX ADMIN — MECLIZINE HYDROCHLORIDE SCH MG: 25 TABLET ORAL at 20:53

## 2020-06-23 RX ADMIN — HYDRALAZINE HYDROCHLORIDE SCH MG: 25 TABLET, FILM COATED ORAL at 11:45

## 2020-06-23 RX ADMIN — LEVOTHYROXINE SODIUM SCH MCG: 75 TABLET ORAL at 05:49

## 2020-06-23 RX ADMIN — Medication SCH: at 08:06

## 2020-06-23 RX ADMIN — HYDRALAZINE HYDROCHLORIDE SCH MG: 25 TABLET, FILM COATED ORAL at 00:02

## 2020-06-23 RX ADMIN — GABAPENTIN SCH MG: 300 CAPSULE ORAL at 08:04

## 2020-06-23 RX ADMIN — ATORVASTATIN CALCIUM SCH MG: 20 TABLET, FILM COATED ORAL at 20:51

## 2020-06-23 RX ADMIN — ACETAMINOPHEN SCH MG: 500 TABLET ORAL at 20:53

## 2020-06-23 RX ADMIN — APIXABAN SCH MG: 2.5 TABLET, FILM COATED ORAL at 08:05

## 2020-06-23 RX ADMIN — DOCUSATE SODIUM,SENNOSIDES SCH TAB: 50; 8.6 TABLET, FILM COATED ORAL at 20:54

## 2020-06-23 RX ADMIN — METOPROLOL TARTRATE SCH MG: 50 TABLET, FILM COATED ORAL at 14:43

## 2020-06-23 RX ADMIN — MAGNESIUM OXIDE SCH MG: 400 TABLET ORAL at 08:05

## 2020-06-23 RX ADMIN — HYDRALAZINE HYDROCHLORIDE SCH MG: 25 TABLET, FILM COATED ORAL at 17:28

## 2020-06-23 RX ADMIN — FERROUS SULFATE TAB 325 MG (65 MG ELEMENTAL FE) SCH MG: 325 (65 FE) TAB at 20:54

## 2020-06-23 RX ADMIN — ACETAMINOPHEN SCH MG: 500 TABLET ORAL at 16:03

## 2020-06-23 RX ADMIN — WHITE PETROLATUM SCH DOSE: 57; 17 PASTE TOPICAL at 20:55

## 2020-06-23 RX ADMIN — PROBIOTIC PRODUCT - TAB SCH EA: TAB at 11:45

## 2020-06-23 RX ADMIN — HYDRALAZINE HYDROCHLORIDE SCH MG: 25 TABLET, FILM COATED ORAL at 05:49

## 2020-06-23 RX ADMIN — MECLIZINE HYDROCHLORIDE SCH MG: 25 TABLET ORAL at 16:03

## 2020-06-23 RX ADMIN — DOCUSATE SODIUM,SENNOSIDES SCH TAB: 50; 8.6 TABLET, FILM COATED ORAL at 08:04

## 2020-06-23 RX ADMIN — WHITE PETROLATUM SCH DOSE: 57; 17 PASTE TOPICAL at 08:06

## 2020-06-23 RX ADMIN — LIDOCAINE SCH PATCH: 50 PATCH CUTANEOUS at 20:51

## 2020-06-23 RX ADMIN — PROBIOTIC PRODUCT - TAB SCH EA: TAB at 17:28

## 2020-06-23 RX ADMIN — METOPROLOL TARTRATE SCH MG: 50 TABLET, FILM COATED ORAL at 05:48

## 2020-06-23 RX ADMIN — GABAPENTIN SCH MG: 300 CAPSULE ORAL at 16:03

## 2020-06-23 RX ADMIN — PROBIOTIC PRODUCT - TAB SCH EA: TAB at 08:04

## 2020-06-23 RX ADMIN — WHITE PETROLATUM SCH DOSE: 57; 17 PASTE TOPICAL at 16:04

## 2020-06-23 NOTE — REP
DEEP VENOUS ULTRASONOGRAPHY RIGHT THIGH, RULE OUT DVT:

 

REASON:  Pain and swelling.

 

TECHNIQUE:  Multiple ultrasonographic images of the deep venous structures of the

thigh were obtained from the common femoral vein to the popliteal vein along with

Doppler interrogation and color flow Doppler images.

 

FINDINGS:  There is no abnormal echogenic material seen within any of the

visualized deep venous structures that would suggest acute thrombosis.

Coaptation is unremarkable throughout.  Doppler interrogation shows an expected

response to respiratory variability and augmentation.  The color flow images show

what appears to be a normal vascular pattern throughout.

 

IMPRESSION:

 

There is no ultrasonographic evidence of deep venous thrombosis involving any of

the visualized deep venous structures of the right thigh, as described above.

 

 

Electronically Signed by

Roque Sharp DO 06/23/2020 10:26 A

## 2020-06-24 VITALS — SYSTOLIC BLOOD PRESSURE: 152 MMHG | DIASTOLIC BLOOD PRESSURE: 72 MMHG

## 2020-06-24 VITALS — DIASTOLIC BLOOD PRESSURE: 74 MMHG | SYSTOLIC BLOOD PRESSURE: 169 MMHG

## 2020-06-24 VITALS — DIASTOLIC BLOOD PRESSURE: 61 MMHG | SYSTOLIC BLOOD PRESSURE: 142 MMHG

## 2020-06-24 LAB
BASOPHILS # BLD AUTO: 0.1 10^3/UL (ref 0–0.2)
BASOPHILS NFR BLD AUTO: 1.5 % (ref 0–1)
BUN SERPL-MCNC: 22 MG/DL (ref 7–18)
CALCIUM SERPL-MCNC: 9.2 MG/DL (ref 8.8–10.2)
CHLORIDE SERPL-SCNC: 105 MEQ/L (ref 98–107)
CO2 SERPL-SCNC: 27 MEQ/L (ref 21–32)
CREAT SERPL-MCNC: 1.16 MG/DL (ref 0.55–1.3)
EOSINOPHIL # BLD AUTO: 0.5 10^3/UL (ref 0–0.5)
EOSINOPHIL NFR BLD AUTO: 5.7 % (ref 0–3)
GFR SERPL CREATININE-BSD FRML MDRD: 47.3 ML/MIN/{1.73_M2} (ref 32–?)
GLUCOSE SERPL-MCNC: 100 MG/DL (ref 70–100)
HCT VFR BLD AUTO: 32.2 % (ref 36–47)
HGB BLD-MCNC: 10.3 G/DL (ref 12–15.5)
LYMPHOCYTES # BLD AUTO: 1.1 10^3/UL (ref 1.5–5)
LYMPHOCYTES NFR BLD AUTO: 12.2 % (ref 24–44)
MCH RBC QN AUTO: 28.9 PG (ref 27–33)
MCHC RBC AUTO-ENTMCNC: 32 G/DL (ref 32–36.5)
MCV RBC AUTO: 90.4 FL (ref 80–96)
MONOCYTES # BLD AUTO: 1.1 10^3/UL (ref 0–0.8)
MONOCYTES NFR BLD AUTO: 12.3 % (ref 0–5)
NEUTROPHILS # BLD AUTO: 6.2 10^3/UL (ref 1.5–8.5)
NEUTROPHILS NFR BLD AUTO: 68.1 % (ref 36–66)
PLATELET # BLD AUTO: 410 10^3/UL (ref 150–450)
POTASSIUM SERPL-SCNC: 4.7 MEQ/L (ref 3.5–5.1)
RBC # BLD AUTO: 3.56 10^6/UL (ref 4–5.4)
SODIUM SERPL-SCNC: 137 MEQ/L (ref 136–145)
WBC # BLD AUTO: 9.2 10^3/UL (ref 4–10)

## 2020-06-24 RX ADMIN — WHITE PETROLATUM SCH DOSE: 57; 17 PASTE TOPICAL at 17:44

## 2020-06-24 RX ADMIN — FERROUS SULFATE TAB 325 MG (65 MG ELEMENTAL FE) SCH MG: 325 (65 FE) TAB at 20:17

## 2020-06-24 RX ADMIN — ACETAMINOPHEN SCH MG: 500 TABLET ORAL at 08:23

## 2020-06-24 RX ADMIN — DOCUSATE SODIUM,SENNOSIDES SCH TAB: 50; 8.6 TABLET, FILM COATED ORAL at 20:17

## 2020-06-24 RX ADMIN — ATORVASTATIN CALCIUM SCH MG: 20 TABLET, FILM COATED ORAL at 20:17

## 2020-06-24 RX ADMIN — PROBIOTIC PRODUCT - TAB SCH EA: TAB at 08:22

## 2020-06-24 RX ADMIN — DOCUSATE SODIUM,SENNOSIDES SCH TAB: 50; 8.6 TABLET, FILM COATED ORAL at 08:22

## 2020-06-24 RX ADMIN — PANTOPRAZOLE SODIUM SCH MG: 40 TABLET, DELAYED RELEASE ORAL at 08:22

## 2020-06-24 RX ADMIN — POLYETHYLENE GLYCOL 3350 SCH PKT: 17 POWDER, FOR SOLUTION ORAL at 08:22

## 2020-06-24 RX ADMIN — GABAPENTIN SCH MG: 300 CAPSULE ORAL at 08:22

## 2020-06-24 RX ADMIN — GABAPENTIN SCH MG: 300 CAPSULE ORAL at 20:18

## 2020-06-24 RX ADMIN — DULOXETINE SCH MG: 20 CAPSULE, DELAYED RELEASE ORAL at 08:22

## 2020-06-24 RX ADMIN — MECLIZINE HYDROCHLORIDE SCH MG: 25 TABLET ORAL at 08:22

## 2020-06-24 RX ADMIN — WHITE PETROLATUM SCH DOSE: 57; 17 PASTE TOPICAL at 20:20

## 2020-06-24 RX ADMIN — APIXABAN SCH MG: 2.5 TABLET, FILM COATED ORAL at 20:17

## 2020-06-24 RX ADMIN — LIDOCAINE SCH PATCH: 50 PATCH CUTANEOUS at 20:20

## 2020-06-24 RX ADMIN — METOPROLOL TARTRATE SCH MG: 50 TABLET, FILM COATED ORAL at 14:41

## 2020-06-24 RX ADMIN — HYDRALAZINE HYDROCHLORIDE SCH MG: 25 TABLET, FILM COATED ORAL at 00:00

## 2020-06-24 RX ADMIN — HYDRALAZINE HYDROCHLORIDE SCH MG: 25 TABLET, FILM COATED ORAL at 12:00

## 2020-06-24 RX ADMIN — ACETAMINOPHEN SCH MG: 500 TABLET ORAL at 17:43

## 2020-06-24 RX ADMIN — APIXABAN SCH MG: 2.5 TABLET, FILM COATED ORAL at 08:23

## 2020-06-24 RX ADMIN — METOPROLOL TARTRATE SCH MG: 50 TABLET, FILM COATED ORAL at 22:25

## 2020-06-24 RX ADMIN — HYDRALAZINE HYDROCHLORIDE SCH MG: 25 TABLET, FILM COATED ORAL at 17:44

## 2020-06-24 RX ADMIN — GABAPENTIN SCH MG: 300 CAPSULE ORAL at 17:43

## 2020-06-24 RX ADMIN — MECLIZINE HYDROCHLORIDE SCH MG: 12.5 TABLET ORAL at 20:17

## 2020-06-24 RX ADMIN — PROBIOTIC PRODUCT - TAB SCH EA: TAB at 12:18

## 2020-06-24 RX ADMIN — WHITE PETROLATUM SCH DOSE: 57; 17 PASTE TOPICAL at 08:24

## 2020-06-24 RX ADMIN — Medication SCH: at 08:24

## 2020-06-24 RX ADMIN — LEVOTHYROXINE SODIUM SCH MCG: 75 TABLET ORAL at 05:55

## 2020-06-24 RX ADMIN — HYDRALAZINE HYDROCHLORIDE SCH MG: 25 TABLET, FILM COATED ORAL at 05:54

## 2020-06-24 RX ADMIN — METOPROLOL TARTRATE SCH MG: 50 TABLET, FILM COATED ORAL at 05:55

## 2020-06-24 RX ADMIN — FERROUS SULFATE TAB 325 MG (65 MG ELEMENTAL FE) SCH MG: 325 (65 FE) TAB at 08:22

## 2020-06-24 RX ADMIN — PROBIOTIC PRODUCT - TAB SCH EA: TAB at 17:43

## 2020-06-24 RX ADMIN — MECLIZINE HYDROCHLORIDE SCH MG: 12.5 TABLET ORAL at 17:44

## 2020-06-24 RX ADMIN — ACETAMINOPHEN SCH MG: 500 TABLET ORAL at 20:18

## 2020-06-24 RX ADMIN — MAGNESIUM OXIDE SCH MG: 400 TABLET ORAL at 08:22

## 2020-06-24 NOTE — IPNPDOC
PM&R Progress Note


DATE OF SERVICE:  Jun 24, 2020


Physiatrist Progress Note


Subjective:


Patient seen in the gym stating she has an ache in her leg and she thinks she 

sat in the recliner too long this morning and that this put pressure on her leg.







REVIEW OF SYSTEMS: The following is a completed review of systems and has been 

reviewed. Review of systems otherwise unremarkable.


PAIN: Patient self reports right hip pain


EYES: No recent vision changes


EARS, NOSE, & THROAT:+intermittent dysphagia solids and liquids (chronic)


CARDIOVASCULAR: Denies chest pain or palpitations


PULMONARY: Denies shortness of breath


GASTROINTESTINAL: +constipation (resolved)


GENITOURINARY:denies dysuria


MUSCULOSKELETAL: right hip fracture


NEUROLOGICAL:denies paresthesias


HEMATOLOGICAL: denies easy bruising, +anemia (improving)


SKIN:right hip incision


PSYCHIATRIC: Unremarkable


All other review of systems found to be negative.








PHYSICAL EXAMINATION:


VITAL SIGNS: Please see below.


GENERAL: Pleasant and cooperative. No acute distress. 


HEENT: PERRL. Extraocular movements intact. Clear conjunctiva


CARDIOVASCULAR: Regular rate and rhythm. No murmurs, rubs, or gallops


LUNGS: Clear to auscultation bilaterally. No wheezes. No rhonchi


ABDOMEN: Soft, nontender, nondistended. Positive bowel sounds. Normal active 

bowel sounds


NEUROLOGICAL: Alert and oriented times three. Cranial nerves II through XII 

grossly intact. Sensation grossly intact


EXTREMITIES: 5\5 strength bilateral upper extremities. 4+\5 strength right ankle

DF/EHL and PF (limited due to surgery) 4+/5 strength in left lower extremity. 


(-) homans' bilat


SKIN: right hip incision c/d/i (staples removed), blanchable sacral erythema











ASSESSMENT:85-year-old F with past medical history of Afib who presents status 

post fall with right femur fracture





PLAN:


1. PT/OT- advance gait and ADLs, strengthen/stretch/maintain ROM all4 limbs-

ambulating with RW, however noted decline over the weekend due to increased pain




-SLP- eval for swallow, patient endorsing intermittent dysphagia- level 2 


2. Ortho- s/p right femur fracture and ORIF, ortho consulted- WBAT


-right medial foot pain reported on inpatient with follow-up Xray negative for 

gross fracture, however due to bone demineralization a small fracture was unable

to be ruled out-ortho consulted, recs appreciated, c/u WBAT


3. Cardiac: Afib s/p ablation on eliquis and metoprolol


-HTN c/u amlodipine,  Micardis on hold due to ALVERTO,  c/u hydralazine for elevated

BPs and increased dose of metoprolol to 50mg q8h - medicine consulted to assist 

in overall management


- CAD c/u ASA


-HLD c/u statin


4. Resp: c/u supplemental 02, encourgae incentive spirometry


5. Neuro: chronic vertigo c/u meclizine, will lower dose from 25-->12.5 mg TID 

to avoid drowsiness while increasing opioids for pain management


6. Renal- hx of CKD with new ALVERTO resolved following s/p blood transfusion 


7. Heme: post-op anemia, s/p 1 unit rbcs, improving 


-patient had some blood per rectum 6-10-20  while attempting to have a bowel 

movement- resolved and no drop in Hgb 


-c/u iron


8. DVT ppx: c/u eliquis


-admission Doppler negative for DVT, and repeat RLE Doppler also negative 


9. GI ppx: Protonix


- c/u  bowel meds


10. : hx of incontinence, now with retention, holding Ditropan, UA + for LE 

and bacteria however Ucx negative, s/p 3 day course of levaquin 


11. Pain: Tylenol, c/u scheduled oxycodone (will increase dosing to 7.5mg) and 

gabapentin (increase to 300mg tid)


-increased cymbalta  to 40mg daily


-c/u Lidoderm patch to right groin qHS


12. Endo: hypothyroidism- c/u synthroid


13. Psych-c/u buspar 5mg daily and cymbalta (increased to 40mg for pain)


14. Hyponatremia- improving, c/u fluid restriction and monitor, no concerning 

neuro/GI symptoms


15. Leukocytosis- resolved, likely due to post-op inflammation 


15. Dispo: 6-30-20 to home, progressing slowly towards goals


Allergies


Coded Allergies:  


     Penicillins (Unverified  Allergy, Unknown, 6/5/20)


     latex (Unverified  Allergy, Unknown, 6/5/20)


     morphine (Unverified  Allergy, Unknown, 6/7/20)


   


   CONFIRMED DOSES ON 6/5 NO REACTION





Vital Signs





Vital Signs








  Date Time  Temp Pulse Resp B/P (MAP) Pulse Ox O2 Delivery O2 Flow Rate FiO2


 


6/24/20 08:23   18     


 


6/24/20 08:23  64  152/72    


 


6/24/20 05:52 97.9    93 Room Air  











Laboratory Data


CBC/BMP


Laboratory Tests


6/24/20 06:25








Labs 24H


Laboratory Tests 2


6/24/20 06:25: 


Immature Granulocyte % (Auto) 0.2, Neutrophils (%) (Auto) 68.1H, Lymphocytes (%)

(Auto) 12.2L, Monocytes (%) (Auto) 12.3H, Eosinophils (%) (Auto) 5.7H, Basophils

(%) (Auto) 1.5H, Neutrophils # (Auto) 6.2, Lymphocytes # (Auto) 1.1L, Monocytes 

# (Auto) 1.1H, Eosinophils # (Auto) 0.5, Basophils # (Auto) 0.1, Nucleated Red 

Blood Cells % (auto) 0.0, Anion Gap 5L, Glomerular Filtration Rate 47.3, Calcium

Level 9.2





Current Medications


Current Medications





Current Medications








 Medications


  (Trade)  Dose


 Ordered  Sig/Carlos


 Route


 PRN Reason  Start Time


 Stop Time Status Last Admin


Dose Admin


 


 Acetaminophen


  (Tylenol Tab)  1,000 mg  Q8H


 PO


   6/8/20 22:00


 6/9/20 15:16 DC 6/9/20 13:20





 


 Acetaminophen


  (Tylenol Tab)  1,000 mg  TID


 PO


   6/9/20 21:00


    6/24/20 08:23





 


 Amlodipine


 Besylate


  (Norvasc)  5 mg  BID


 PO


   6/8/20 21:00


    6/24/20 08:23





 


 Apixaban


  (Eliquis)  2.5 mg  BID


 PO


   6/8/20 21:00


    6/24/20 08:23





 


 Atorvastatin


 Calcium


  (Lipitor)  20 mg  QHS


 PO


   6/8/20 21:00


    6/23/20 20:51





 


 Bisacodyl


  (Dulcolax


 Suppository)  10 mg  DAILYPRN  PRN


 [place]


 CONSTIPATION  6/10/20 13:00


 6/10/20 12:53 DC  





 


 Bisacodyl


  (Dulcolax


 Suppository)  10 mg  DAILYPRN  PRN


 [place]


 CONSTIPATION  6/8/20 14:45


     





 


 Buspirone HCl


  (Buspar)  5 mg  BID


 PO


   6/8/20 21:00


 6/10/20 13:38 DC 6/10/20 09:04





 


 Buspirone HCl


  (Buspar)  5 mg  DAILY


 PO


   6/11/20 09:00


    6/24/20 08:23





 


 Duloxetine HCl


  (Cymbalta)  30 mg  DAILY


 PO


   6/10/20 09:00


 6/22/20 13:50 DC 6/22/20 09:04





 


 Duloxetine HCl


  (Cymbalta)  40 mg  DAILY


 PO


   6/23/20 09:00


    6/24/20 08:22





 


 Ferrous Sulfate


  (Ferrous Sulfate)  325 mg  BID


 PO


   6/9/20 21:00


    6/24/20 08:22





 


 Gabapentin


  (Neurontin)  100 mg  BID


 PO


   6/8/20 21:00


 6/21/20 08:42 DC 6/21/20 08:34





 


 Gabapentin


  (Neurontin)  200 mg  BID


 PO


   6/21/20 21:00


 6/22/20 13:50 DC 6/22/20 09:06





 


 Gabapentin


  (Neurontin)  300 mg  TID


 PO


   6/22/20 16:00


    6/24/20 08:22





 


 Hydralazine HCl


  (Apresoline)  25 mg  Q6H


 PO


   6/18/20 12:00


    6/24/20 05:54





 


 Hydrocortisone


  (Hydrocortisone


 2.5% Ointment)  right


 antecubital


 area  BID  PRN


 TOP


 itch  6/16/20 14:00


    6/16/20 16:02





 


 Lactobacillus


 Acidophilus


  (Bacid)  1 ea  WM


 PO


   6/10/20 12:30


    6/24/20 08:22





 


 Levofloxacin


  (Levaquin)  250 mg  DAILY@06


 PO


   6/10/20 06:00


 6/14/20 04:37 DC 6/13/20 05:50





 


 Levothyroxine


 Sodium


  (Synthroid)  75 mcg  DAILY@06


 PO


   6/9/20 06:00


    6/24/20 05:55





 


 Lidocaine


  (Lidoderm Patch)  1 patch  QHS


 TD


   6/17/20 21:00


    6/23/20 20:51





 


 Magnesium Oxide


  (Mag-Ox)  400 mg  DAILY


 PO


   6/9/20 09:00


    6/24/20 08:22





 


 Meclizine HCl


  (Antivert)  25 mg  TID


 PO


   6/8/20 16:00


    6/24/20 08:22





 


 Metoprolol


 Tartrate


  (Lopressor)  50 mg  BID


 PO


   6/8/20 21:00


 6/22/20 10:17 DC 6/22/20 09:04





 


 Metoprolol


 Tartrate


  (Lopressor)  50 mg  Q8H


 PO


   6/22/20 22:00


    6/24/20 05:55





 


 Miscellaneous


  (Unresolved


 Clarification


 Entry)  SEE LABEL


 COMMENTS  DAILY


 XX


   6/20/20 09:00


 6/21/20 11:02 DC  





 


 Non-Formulary


 Medication


  (** See Comment


 Field Below **)  REMOVE


 LIDODERM


 PATCH  DAILY@0900


 XX


   6/18/20 09:00


    6/24/20 08:24





 


 Ondansetron HCl


  (Zofran Odt)  4 mg  Q4HP  PRN


 PO


 NAUSEA OR VOMITING  6/8/20 14:45


    6/15/20 21:19





 


 Oxybutynin


 Chloride


  (Ditropan)  5 mg  BID


 PO


   6/8/20 21:00


 6/9/20 13:50 DC 6/9/20 08:22





 


 Oxycodone HCl


  (Roxicodone,


 Oxyir)  5 mg  0800,1200,1600,2100


 PO


   6/22/20 16:00


    6/24/20 08:23





 


 Oxycodone HCl


  (Roxicodone,


 Oxyir)  5 mg  Q4HP  PRN


 PO


 PAIN  6/8/20 14:45


 6/22/20 13:50 DC 6/22/20 06:14





 


 Pantoprazole


 Sodium


  (Protonix)  40 mg  DAILY


 PO


   6/9/20 09:00


    6/24/20 08:22





 


 Polyethylene


 Glycol


  (Miralax)  1 pkt  DAILY


 PO


   6/10/20 09:00


    6/24/20 08:22





 


 Senna/Docusate


 Sodium


  (Senokot S)  2 tab  BID


 PO


   6/8/20 21:00


    6/24/20 08:22





 


 Sodium


 Biphosphate/


 Sodium Phosphate


  (Fleet Enema)  1 ea  DAILYPRN  PRN


 [place]


 CONSTIPATION  6/10/20 13:00


    6/10/20 21:49





 


 Telmisartan


  (Micardis)  20 mg  QHS


 PO


   6/8/20 21:00


 6/9/20 10:25 DC 6/8/20 21:06




















FARAZ SHIN MD         Jun 24, 2020 10:40

## 2020-06-24 NOTE — IPNPDOC
PM&R Progress Note


DATE OF SERVICE:  Jun 23, 2020


Physiatrist Progress Note


Subjective:


Patient stating she continues to have groin pain with ambulation and that it is 

not much better since scheduling her pain medication. 





REVIEW OF SYSTEMS: The following is a completed review of systems and has been 

reviewed. Review of systems otherwise unremarkable.


PAIN: Patient self reports right hip pain


EYES: No recent vision changes


EARS, NOSE, & THROAT:+intermittent dysphagia solids and liquids (chronic)


CARDIOVASCULAR: Denies chest pain or palpitations


PULMONARY: Denies shortness of breath


GASTROINTESTINAL: +constipation (resolved)


GENITOURINARY:denies dysuria


MUSCULOSKELETAL: right hip fracture


NEUROLOGICAL:denies paresthesias


HEMATOLOGICAL: denies easy bruising, +anemia (improving)


SKIN:right hip incision


PSYCHIATRIC: Unremarkable


All other review of systems found to be negative.








PHYSICAL EXAMINATION:


VITAL SIGNS: Please see below.


GENERAL: Pleasant and cooperative. No acute distress. 


HEENT: PERRL. Extraocular movements intact. Clear conjunctiva


CARDIOVASCULAR: Regular rate and rhythm. No murmurs, rubs, or gallops


LUNGS: Clear to auscultation bilaterally. No wheezes. No rhonchi


ABDOMEN: Soft, nontender, nondistended. Positive bowel sounds. Normal active 

bowel sounds


NEUROLOGICAL: Alert and oriented times three. Cranial nerves II through XII 

grossly intact. Sensation grossly intact


EXTREMITIES: 5\5 strength bilateral upper extremities. 4+\5 strength right ankle

DF/EHL and PF (limited due to surgery) 4+/5 strength in left lower extremity. 


(-) homans' bilat


SKIN: right hip incision c/d/i (staples removed), blanchable sacral erythema











ASSESSMENT:85-year-old F with past medical history of Afib who presents status 

post fall with right femur fracture





PLAN:


1. PT/OT- advance gait and ADLs, strengthen/stretch/maintain ROM all4 limbs-

ambulating with RW, however noted decline over the weekend due to increased pain




-SLP- eval for swallow, patient endorsing intermittent dysphagia- level 2 


2. Ortho- s/p right femur fracture and ORIF, ortho consulted- WBAT


-right medial foot pain reported on inpatient with follow-up Xray negative for 

gross fracture, however due to bone demineralization a small fracture was unable

to be ruled out-ortho consulted, recs appreciated, c/u WBAT


3. Cardiac: Afib s/p ablation on eliquis and metoprolol


-HTN c/u amlodipine,  Micardis on hold due to ALVERTO,  c/u hydralazine for elevated

BPs and increased dose of metoprolol to 50mg q8h - medicine consulted to assist 

in overall management


- CAD c/u ASA


-HLD c/u statin


4. Resp: c/u supplemental 02, encourgae incentive spirometry


5. Neuro: chronic vertigo c/u meclizine


6. Renal- hx of CKD with new ALVERTO resolved following s/p blood transfusion 


7. Heme: post-op anemia, s/p 1 unit rbcs, improving 


-patient had some blood per rectum 6-10-20  while attempting to have a bowel 

movement- resolved and no drop in Hgb 


-c/u iron


8. DVT ppx: c/u eliquis


-admission Doppler negative for DVT, and repeat RLE Doppler also negative 


9. GI ppx: Protonix


- c/u  bowel meds


10. : hx of incontinence, now with retention, holding Ditropan, UA + for LE 

and bacteria however Ucx negative, s/p 3 day course of levaquin 


11. Pain: Tylenol, c/u scheduled oxycodone and gabapentin (increase to 300mg 

tid)


-increased cymbalta  to 40mg daily


-c/u Lidoderm patch to right groin qHS


12. Endo: hypothyroidism- c/u synthroid


13. Psych-c/u buspar 5mg daily and cymbalta (will increase to 40mg for pain)


14. Hyponatremia- improving, c/u fluid restriction and monitor, no concerning 

neuro/GI symptoms


15. Leukocytosis- no fever, denies dysuria or cough, likely still inflammatory 

from recent surgery, will monitor


15. Dispo: 6-30-20 to home, progressing slowly towards goals


Allergies


Coded Allergies:  


     Penicillins (Unverified  Allergy, Unknown, 6/5/20)


     latex (Unverified  Allergy, Unknown, 6/5/20)


     morphine (Unverified  Allergy, Unknown, 6/7/20)


   


   CONFIRMED DOSES ON 6/5 NO REACTION





Vital Signs





Vital Signs








  Date Time  Temp Pulse Resp B/P (MAP) Pulse Ox O2 Delivery O2 Flow Rate FiO2


 


6/24/20 08:23   18     


 


6/24/20 08:23  64  152/72    


 


6/24/20 05:52 97.9    93 Room Air  











Laboratory Data


CBC/BMP


Laboratory Tests


6/24/20 06:25








Labs 24H


Laboratory Tests 2


6/24/20 06:25: 


Immature Granulocyte % (Auto) 0.2, Neutrophils (%) (Auto) 68.1H, Lymphocytes (%)

(Auto) 12.2L, Monocytes (%) (Auto) 12.3H, Eosinophils (%) (Auto) 5.7H, Basophils

(%) (Auto) 1.5H, Neutrophils # (Auto) 6.2, Lymphocytes # (Auto) 1.1L, Monocytes 

# (Auto) 1.1H, Eosinophils # (Auto) 0.5, Basophils # (Auto) 0.1, Nucleated Red 

Blood Cells % (auto) 0.0, Anion Gap 5L, Glomerular Filtration Rate 47.3, Calcium

Level 9.2





Current Medications


Current Medications





Current Medications








 Medications


  (Trade)  Dose


 Ordered  Sig/Carlos


 Route


 PRN Reason  Start Time


 Stop Time Status Last Admin


Dose Admin


 


 Acetaminophen


  (Tylenol Tab)  1,000 mg  Q8H


 PO


   6/8/20 22:00


 6/9/20 15:16 DC 6/9/20 13:20





 


 Acetaminophen


  (Tylenol Tab)  1,000 mg  TID


 PO


   6/9/20 21:00


    6/24/20 08:23





 


 Amlodipine


 Besylate


  (Norvasc)  5 mg  BID


 PO


   6/8/20 21:00


    6/24/20 08:23





 


 Apixaban


  (Eliquis)  2.5 mg  BID


 PO


   6/8/20 21:00


    6/24/20 08:23





 


 Atorvastatin


 Calcium


  (Lipitor)  20 mg  QHS


 PO


   6/8/20 21:00


    6/23/20 20:51





 


 Bisacodyl


  (Dulcolax


 Suppository)  10 mg  DAILYPRN  PRN


 AK


 CONSTIPATION  6/10/20 13:00


 6/10/20 12:53 DC  





 


 Bisacodyl


  (Dulcolax


 Suppository)  10 mg  DAILYPRN  PRN


 AK


 CONSTIPATION  6/8/20 14:45


     





 


 Buspirone HCl


  (Buspar)  5 mg  BID


 PO


   6/8/20 21:00


 6/10/20 13:38 DC 6/10/20 09:04





 


 Buspirone HCl


  (Buspar)  5 mg  DAILY


 PO


   6/11/20 09:00


    6/24/20 08:23





 


 Duloxetine HCl


  (Cymbalta)  30 mg  DAILY


 PO


   6/10/20 09:00


 6/22/20 13:50 DC 6/22/20 09:04





 


 Duloxetine HCl


  (Cymbalta)  40 mg  DAILY


 PO


   6/23/20 09:00


    6/24/20 08:22





 


 Ferrous Sulfate


  (Ferrous Sulfate)  325 mg  BID


 PO


   6/9/20 21:00


    6/24/20 08:22





 


 Gabapentin


  (Neurontin)  100 mg  BID


 PO


   6/8/20 21:00


 6/21/20 08:42 DC 6/21/20 08:34





 


 Gabapentin


  (Neurontin)  200 mg  BID


 PO


   6/21/20 21:00


 6/22/20 13:50 DC 6/22/20 09:06





 


 Gabapentin


  (Neurontin)  300 mg  TID


 PO


   6/22/20 16:00


    6/24/20 08:22





 


 Hydralazine HCl


  (Apresoline)  25 mg  Q6H


 PO


   6/18/20 12:00


    6/24/20 05:54





 


 Hydrocortisone


  (Hydrocortisone


 2.5% Ointment)  right


 antecubital


 area  BID  PRN


 TOP


 itch  6/16/20 14:00


    6/16/20 16:02





 


 Lactobacillus


 Acidophilus


  (Bacid)  1 ea  WM


 PO


   6/10/20 12:30


    6/24/20 08:22





 


 Levofloxacin


  (Levaquin)  250 mg  DAILY@06


 PO


   6/10/20 06:00


 6/14/20 04:37 DC 6/13/20 05:50





 


 Levothyroxine


 Sodium


  (Synthroid)  75 mcg  DAILY@06


 PO


   6/9/20 06:00


    6/24/20 05:55





 


 Lidocaine


  (Lidoderm Patch)  1 patch  QHS


 TD


   6/17/20 21:00


    6/23/20 20:51





 


 Magnesium Oxide


  (Mag-Ox)  400 mg  DAILY


 PO


   6/9/20 09:00


    6/24/20 08:22





 


 Meclizine HCl


  (Antivert)  25 mg  TID


 PO


   6/8/20 16:00


    6/24/20 08:22





 


 Metoprolol


 Tartrate


  (Lopressor)  50 mg  BID


 PO


   6/8/20 21:00


 6/22/20 10:17 DC 6/22/20 09:04





 


 Metoprolol


 Tartrate


  (Lopressor)  50 mg  Q8H


 PO


   6/22/20 22:00


    6/24/20 05:55





 


 Miscellaneous


  (Unresolved


 Clarification


 Entry)  SEE LABEL


 COMMENTS  DAILY


 XX


   6/20/20 09:00


 6/21/20 11:02 DC  





 


 Non-Formulary


 Medication


  (** See Comment


 Field Below **)  REMOVE


 LIDODERM


 PATCH  DAILY@0900


 XX


   6/18/20 09:00


    6/24/20 08:24





 


 Ondansetron HCl


  (Zofran Odt)  4 mg  Q4HP  PRN


 PO


 NAUSEA OR VOMITING  6/8/20 14:45


    6/15/20 21:19





 


 Oxybutynin


 Chloride


  (Ditropan)  5 mg  BID


 PO


   6/8/20 21:00


 6/9/20 13:50 DC 6/9/20 08:22





 


 Oxycodone HCl


  (Roxicodone,


 Oxyir)  5 mg  0800,1200,1600,2100


 PO


   6/22/20 16:00


    6/24/20 08:23





 


 Oxycodone HCl


  (Roxicodone,


 Oxyir)  5 mg  Q4HP  PRN


 PO


 PAIN  6/8/20 14:45


 6/22/20 13:50 DC 6/22/20 06:14





 


 Pantoprazole


 Sodium


  (Protonix)  40 mg  DAILY


 PO


   6/9/20 09:00


    6/24/20 08:22





 


 Polyethylene


 Glycol


  (Miralax)  1 pkt  DAILY


 PO


   6/10/20 09:00


    6/24/20 08:22





 


 Senna/Docusate


 Sodium


  (Senokot S)  2 tab  BID


 PO


   6/8/20 21:00


    6/24/20 08:22





 


 Sodium


 Biphosphate/


 Sodium Phosphate


  (Fleet Enema)  1 ea  DAILYPRN  PRN


 AK


 CONSTIPATION  6/10/20 13:00


    6/10/20 21:49





 


 Telmisartan


  (Micardis)  20 mg  QHS


 PO


   6/8/20 21:00


 6/9/20 10:25 DC 6/8/20 21:06




















FARAZ SHIN MD         Jun 24, 2020 10:38

## 2020-06-25 VITALS — SYSTOLIC BLOOD PRESSURE: 174 MMHG | DIASTOLIC BLOOD PRESSURE: 65 MMHG

## 2020-06-25 VITALS — SYSTOLIC BLOOD PRESSURE: 142 MMHG | DIASTOLIC BLOOD PRESSURE: 65 MMHG

## 2020-06-25 VITALS — SYSTOLIC BLOOD PRESSURE: 148 MMHG | DIASTOLIC BLOOD PRESSURE: 67 MMHG

## 2020-06-25 RX ADMIN — PROBIOTIC PRODUCT - TAB SCH EA: TAB at 08:49

## 2020-06-25 RX ADMIN — WHITE PETROLATUM SCH DOSE: 57; 17 PASTE TOPICAL at 21:44

## 2020-06-25 RX ADMIN — ACETAMINOPHEN SCH MG: 500 TABLET ORAL at 08:51

## 2020-06-25 RX ADMIN — MECLIZINE HYDROCHLORIDE SCH MG: 25 TABLET ORAL at 17:00

## 2020-06-25 RX ADMIN — GABAPENTIN SCH MG: 300 CAPSULE ORAL at 08:49

## 2020-06-25 RX ADMIN — FERROUS SULFATE TAB 325 MG (65 MG ELEMENTAL FE) SCH MG: 325 (65 FE) TAB at 21:44

## 2020-06-25 RX ADMIN — HYDRALAZINE HYDROCHLORIDE SCH MG: 25 TABLET, FILM COATED ORAL at 05:10

## 2020-06-25 RX ADMIN — PANTOPRAZOLE SODIUM SCH MG: 40 TABLET, DELAYED RELEASE ORAL at 08:50

## 2020-06-25 RX ADMIN — HYDRALAZINE HYDROCHLORIDE SCH MG: 25 TABLET, FILM COATED ORAL at 19:41

## 2020-06-25 RX ADMIN — ACETAMINOPHEN SCH MG: 500 TABLET ORAL at 17:00

## 2020-06-25 RX ADMIN — ATORVASTATIN CALCIUM SCH MG: 20 TABLET, FILM COATED ORAL at 21:43

## 2020-06-25 RX ADMIN — DOCUSATE SODIUM,SENNOSIDES SCH TAB: 50; 8.6 TABLET, FILM COATED ORAL at 08:51

## 2020-06-25 RX ADMIN — GABAPENTIN SCH MG: 300 CAPSULE ORAL at 21:44

## 2020-06-25 RX ADMIN — WHITE PETROLATUM SCH DOSE: 57; 17 PASTE TOPICAL at 17:00

## 2020-06-25 RX ADMIN — FERROUS SULFATE TAB 325 MG (65 MG ELEMENTAL FE) SCH MG: 325 (65 FE) TAB at 08:50

## 2020-06-25 RX ADMIN — LIDOCAINE SCH PATCH: 50 PATCH CUTANEOUS at 21:43

## 2020-06-25 RX ADMIN — APIXABAN SCH MG: 2.5 TABLET, FILM COATED ORAL at 08:51

## 2020-06-25 RX ADMIN — DULOXETINE SCH MG: 20 CAPSULE, DELAYED RELEASE ORAL at 08:51

## 2020-06-25 RX ADMIN — METOPROLOL TARTRATE SCH MG: 50 TABLET, FILM COATED ORAL at 21:46

## 2020-06-25 RX ADMIN — HYDRALAZINE HYDROCHLORIDE SCH MG: 25 TABLET, FILM COATED ORAL at 12:11

## 2020-06-25 RX ADMIN — MENTHOL, METHYL SALICYLATE SCH DOSE: 10; 15 CREAM TOPICAL at 17:00

## 2020-06-25 RX ADMIN — Medication SCH: at 08:52

## 2020-06-25 RX ADMIN — PROBIOTIC PRODUCT - TAB SCH EA: TAB at 12:10

## 2020-06-25 RX ADMIN — ACETAMINOPHEN SCH MG: 500 TABLET ORAL at 21:43

## 2020-06-25 RX ADMIN — GABAPENTIN SCH MG: 300 CAPSULE ORAL at 17:00

## 2020-06-25 RX ADMIN — MENTHOL, METHYL SALICYLATE SCH DOSE: 10; 15 CREAM TOPICAL at 21:00

## 2020-06-25 RX ADMIN — POLYETHYLENE GLYCOL 3350 SCH PKT: 17 POWDER, FOR SOLUTION ORAL at 08:49

## 2020-06-25 RX ADMIN — DOCUSATE SODIUM,SENNOSIDES SCH TAB: 50; 8.6 TABLET, FILM COATED ORAL at 21:44

## 2020-06-25 RX ADMIN — APIXABAN SCH MG: 2.5 TABLET, FILM COATED ORAL at 21:43

## 2020-06-25 RX ADMIN — HYDRALAZINE HYDROCHLORIDE SCH MG: 25 TABLET, FILM COATED ORAL at 00:13

## 2020-06-25 RX ADMIN — LEVOTHYROXINE SODIUM SCH MCG: 75 TABLET ORAL at 05:09

## 2020-06-25 RX ADMIN — METOPROLOL TARTRATE SCH MG: 50 TABLET, FILM COATED ORAL at 05:09

## 2020-06-25 RX ADMIN — WHITE PETROLATUM SCH DOSE: 57; 17 PASTE TOPICAL at 08:52

## 2020-06-25 RX ADMIN — METOPROLOL TARTRATE SCH MG: 50 TABLET, FILM COATED ORAL at 14:52

## 2020-06-25 RX ADMIN — MECLIZINE HYDROCHLORIDE SCH MG: 12.5 TABLET ORAL at 08:52

## 2020-06-25 RX ADMIN — MAGNESIUM OXIDE SCH MG: 400 TABLET ORAL at 08:49

## 2020-06-25 RX ADMIN — MECLIZINE HYDROCHLORIDE SCH MG: 25 TABLET ORAL at 21:44

## 2020-06-25 RX ADMIN — PROBIOTIC PRODUCT - TAB SCH EA: TAB at 19:40

## 2020-06-25 NOTE — IPNPDOC
PM&R Progress Note


DATE OF SERVICE:  Jun 25, 2020


Physiatrist Progress Note


Subjective:


Patient reporting she still has a lot of pain and that the increased oxycodone 

has not helped. She agrees she feels more tired and lethargic. She thinks her 

ight arm is weaker and sore beginning a week ago due to putting more weight 

through it and she gets fatigues when she goes to use it. 





REVIEW OF SYSTEMS: The following is a completed review of systems and has been 

reviewed. Review of systems otherwise unremarkable.


PAIN: Patient self reports right hip pain


EYES: No recent vision changes


EARS, NOSE, & THROAT:+intermittent dysphagia solids and liquids (chronic)


CARDIOVASCULAR: Denies chest pain or palpitations


PULMONARY: Denies shortness of breath


GASTROINTESTINAL: +constipation (resolved)


GENITOURINARY:denies dysuria


MUSCULOSKELETAL: right hip fracture


NEUROLOGICAL:denies paresthesias


HEMATOLOGICAL: denies easy bruising, +anemia (improving)


SKIN:right hip incision


PSYCHIATRIC: Unremarkable


All other review of systems found to be negative.








PHYSICAL EXAMINATION:


VITAL SIGNS: Please see below.


GENERAL: Pleasant and cooperative. No acute distress. 


HEENT: PERRL. Extraocular movements intact. Clear conjunctiva


CARDIOVASCULAR: Regular rate and rhythm. No murmurs, rubs, or gallops


LUNGS: Clear to auscultation bilaterally. No wheezes. No rhonchi


ABDOMEN: Soft, nontender, nondistended. Positive bowel sounds. Normal active 

bowel sounds


NEUROLOGICAL: Alert and oriented times three. Cranial nerves II through XII 

grossly intact. Sensation grossly intact


no tremor in bilat UE


EXTREMITIES: 5\5 strength bilateral upper extremities. 4+\5 strength right ankle

DF/EHL and PF (limited due to surgery) 4+/5 strength in left lower extremity. 


(-) homans' bilat


SKIN: right hip incision c/d/i (staples removed), blanchable sacral erythema











ASSESSMENT:85-year-old F with past medical history of Afib who presents status 

post fall with right femur fracture





PLAN:


1. PT/OT- advance gait and ADLs, strengthen/stretch/maintain ROM all 4 limbs-

ambulating with RW, however noted decline over the weekend due to increased pain




-SLP- eval for swallow, patient endorsing intermittent dysphagia- level 2 


2. Ortho- s/p right femur fracture and ORIF, ortho consulted- WBAT


-right medial foot pain reported on inpatient with follow-up Xray negative for 

gross fracture, however due to bone demineralization a small fracture was unable

to be ruled out-ortho consulted, recs appreciated, c/u WBAT


-patient not progressing in therapy at this time due to poor pain control, will 

repeat hip X-ray and alert ortho 


3. Cardiac: Afib s/p ablation on eliquis and metoprolol


-HTN c/u amlodipine,  Micardis on hold due to ALVERTO,  c/u hydralazine for elevated

BPs and increased dose of metoprolol to 50mg q8h - medicine consulted to assist 

in overall management


- CAD c/u ASA


-HLD c/u statin


4. Resp: c/u supplemental 02, encourage incentive spirometry


5. Neuro: chronic vertigo c/u meclizine, will resume home dose now that she will

be on lower dose of opioids


6. Renal- hx of CKD with new ALVERTO resolved following s/p blood transfusion 


7. Heme: post-op anemia, s/p 1 unit rbcs, improving 


-patient had some blood per rectum 6-10-20  while attempting to have a bowel 

movement- resolved and no drop in Hgb 


-c/u iron


8. DVT ppx: c/u eliquis


-admission Doppler negative for DVT, and repeat RLE Doppler also negative 


9. GI ppx: Protonix


- c/u  bowel meds


10. : hx of incontinence, now with retention, holding Ditropan, UA + for LE 

and bacteria however Ucx negative, s/p 3 day course of levaquin 


11. Pain: Tylenol, c/u scheduled oxycodone, had tried 7.5mg, however patient 

reports it is not really touching her pain and appears more pethargic, will 

decrease back to 5mg, c/u gabapentin  300mg tid


-increased cymbalta  to 40mg daily


-c/u Lidoderm patch to right groin qHS


12. Endo: hypothyroidism- c/u synthroid


13. Psych-c/u buspar 5mg daily and cymbalta (increased to 40mg for pain)


14. Hyponatremia- improving, c/u fluid restriction and monitor, no concerning 

neuro/GI symptoms


15. Leukocytosis- resolved, likely due to post-op inflammation 


16. RUE tremor- therapy reporting patient had RUE tremor in therapy, patient 

states her right arm has been sore and feels weak since using it to propel her 

walker and that her arm has felt shaky for about a week, will trial menthol 

salicylate rub for pain relief


17. Dispo: 6-30-20 to home, progressing slowly towards goals


Allergies


Coded Allergies:  


     Penicillins (Unverified  Allergy, Unknown, 6/5/20)


     latex (Unverified  Allergy, Unknown, 6/5/20)


     morphine (Unverified  Allergy, Unknown, 6/7/20)


   


   CONFIRMED DOSES ON 6/5 NO REACTION





Vital Signs





Vital Signs








  Date Time  Temp Pulse Resp B/P (MAP) Pulse Ox O2 Delivery O2 Flow Rate FiO2


 


6/25/20 12:11    152/67    


 


6/25/20 09:30   18     


 


6/25/20 08:51  60      


 


6/25/20 06:00 97.3    92 Room Air  











Current Medications


Current Medications





Current Medications








 Medications


  (Trade)  Dose


 Ordered  Sig/Carlos


 Route


 PRN Reason  Start Time


 Stop Time Status Last Admin


Dose Admin


 


 Acetaminophen


  (Tylenol Tab)  1,000 mg  Q8H


 PO


   6/8/20 22:00


 6/9/20 15:16 DC 6/9/20 13:20





 


 Acetaminophen


  (Tylenol Tab)  1,000 mg  TID


 PO


   6/9/20 21:00


    6/25/20 08:51





 


 Amlodipine


 Besylate


  (Norvasc)  5 mg  BID


 PO


   6/8/20 21:00


    6/25/20 08:51





 


 Apixaban


  (Eliquis)  2.5 mg  BID


 PO


   6/8/20 21:00


    6/25/20 08:51





 


 Atorvastatin


 Calcium


  (Lipitor)  20 mg  QHS


 PO


   6/8/20 21:00


    6/24/20 20:17





 


 Bisacodyl


  (Dulcolax


 Suppository)  10 mg  DAILYPRN  PRN


 CO


 CONSTIPATION  6/10/20 13:00


 6/10/20 12:53 DC  





 


 Bisacodyl


  (Dulcolax


 Suppository)  10 mg  DAILYPRN  PRN


 CO


 CONSTIPATION  6/8/20 14:45


     





 


 Buspirone HCl


  (Buspar)  5 mg  BID


 PO


   6/8/20 21:00


 6/10/20 13:38 DC 6/10/20 09:04





 


 Buspirone HCl


  (Buspar)  5 mg  DAILY


 PO


   6/11/20 09:00


    6/25/20 08:49





 


 Duloxetine HCl


  (Cymbalta)  30 mg  DAILY


 PO


   6/10/20 09:00


 6/22/20 13:50 DC 6/22/20 09:04





 


 Duloxetine HCl


  (Cymbalta)  40 mg  DAILY


 PO


   6/23/20 09:00


    6/25/20 08:51





 


 Ferrous Sulfate


  (Ferrous Sulfate)  325 mg  BID


 PO


   6/9/20 21:00


    6/25/20 08:50





 


 Gabapentin


  (Neurontin)  100 mg  BID


 PO


   6/8/20 21:00


 6/21/20 08:42 DC 6/21/20 08:34





 


 Gabapentin


  (Neurontin)  200 mg  BID


 PO


   6/21/20 21:00


 6/22/20 13:50 DC 6/22/20 09:06





 


 Gabapentin


  (Neurontin)  300 mg  TID


 PO


   6/22/20 16:00


    6/25/20 08:49





 


 Hydralazine HCl


  (Apresoline)  25 mg  Q6H


 PO


   6/18/20 12:00


    6/25/20 12:11





 


 Hydrocortisone


  (Hydrocortisone


 2.5% Ointment)  right


 antecubital


 area  BID  PRN


 TOP


 itch  6/16/20 14:00


    6/16/20 16:02





 


 Lactobacillus


 Acidophilus


  (Bacid)  1 ea  WM


 PO


   6/10/20 12:30


    6/25/20 12:10





 


 Levofloxacin


  (Levaquin)  250 mg  DAILY@06


 PO


   6/10/20 06:00


 6/14/20 04:37 DC 6/13/20 05:50





 


 Levothyroxine


 Sodium


  (Synthroid)  75 mcg  DAILY@06


 PO


   6/9/20 06:00


    6/25/20 05:09





 


 Lidocaine


  (Lidoderm Patch)  1 patch  QHS


 TD


   6/17/20 21:00


    6/24/20 20:20





 


 Magnesium Oxide


  (Mag-Ox)  400 mg  DAILY


 PO


   6/9/20 09:00


    6/25/20 08:49





 


 Meclizine HCl


  (Antivert)  12.5 mg  TID


 PO


   6/24/20 16:00


 6/25/20 10:45 DC 6/25/20 08:52





 


 Meclizine HCl


  (Antivert)  25 mg  TID


 PO


   6/25/20 16:00


     





 


 Meclizine HCl


  (Antivert)  25 mg  TID


 PO


   6/8/20 16:00


 6/24/20 10:27 DC 6/24/20 08:22





 


 Metoprolol


 Tartrate


  (Lopressor)  50 mg  BID


 PO


   6/8/20 21:00


 6/22/20 10:17 DC 6/22/20 09:04





 


 Metoprolol


 Tartrate


  (Lopressor)  50 mg  Q8H


 PO


   6/22/20 22:00


    6/25/20 05:09





 


 Miscellaneous


  (Unresolved


 Clarification


 Entry)  SEE LABEL


 COMMENTS  DAILY


 XX


   6/20/20 09:00


 6/21/20 11:02 DC  





 


 Non-Formulary


 Medication


  (** See Comment


 Field Below **)  REMOVE


 LIDODERM


 PATCH  DAILY@0900


 XX


   6/18/20 09:00


    6/25/20 08:52





 


 Ondansetron HCl


  (Zofran Odt)  4 mg  Q4HP  PRN


 PO


 NAUSEA OR VOMITING  6/8/20 14:45


    6/15/20 21:19





 


 Oxybutynin


 Chloride


  (Ditropan)  5 mg  BID


 PO


   6/8/20 21:00


 6/9/20 13:50 DC 6/9/20 08:22





 


 Oxycodone HCl


  (Roxicodone,


 Oxyir)  5 mg  0800,1200,1600,2100


 PO


   6/22/20 16:00


 6/24/20 10:27 DC 6/24/20 08:23





 


 Oxycodone HCl


  (Roxicodone,


 Oxyir)  5 mg  Q4HP  PRN


 PO


 PAIN  6/8/20 14:45


 6/22/20 13:50 DC 6/22/20 06:14





 


 Oxycodone HCl


  (Roxicodone,


 Oxyir)  7.5 mg  0600,1000,1500,2000


 PO


   6/24/20 10:00


    6/25/20 08:50





 


 Pantoprazole


 Sodium


  (Protonix)  40 mg  DAILY


 PO


   6/9/20 09:00


    6/25/20 08:50





 


 Polyethylene


 Glycol


  (Miralax)  1 pkt  DAILY


 PO


   6/10/20 09:00


    6/25/20 08:49





 


 Senna/Docusate


 Sodium


  (Senokot S)  2 tab  BID


 PO


   6/8/20 21:00


    6/25/20 08:51





 


 Sodium


 Biphosphate/


 Sodium Phosphate


  (Fleet Enema)  1 ea  DAILYPRN  PRN


 CO


 CONSTIPATION  6/10/20 13:00


    6/10/20 21:49





 


 Telmisartan


  (Micardis)  20 mg  QHS


 PO


   6/8/20 21:00


 6/9/20 10:25 DC 6/8/20 21:06




















FARAZ SHIN MD         Jun 25, 2020 12:27

## 2020-06-25 NOTE — REP
RIGHT HIP:  Two views.

 

HISTORY:  Persistent pain.

 

Comparison right hip radiographs are from June 5, 2020.

 

FINDINGS:  Intermedullary tiff and femoral neck pin are again seen in place

transfixing the slightly impacted subtrochanteric fracture in position unchanged

from the intraoperative radiographs June 7, 2020.  There is adjacent soft tissue

vascular calcification and surgical clips.  No bony destructive lesion or new

fracture seen.

 

IMPRESSION:

 

Status post open reduction internal fixation for subtrochanteric proximal femur

fracture unchanged in position from June 7, 2020.

 

 

Electronically Signed by

Leroy Mckeon MD 06/25/2020 02:19 P

## 2020-06-26 VITALS — SYSTOLIC BLOOD PRESSURE: 168 MMHG | DIASTOLIC BLOOD PRESSURE: 74 MMHG

## 2020-06-26 VITALS — SYSTOLIC BLOOD PRESSURE: 154 MMHG | DIASTOLIC BLOOD PRESSURE: 62 MMHG

## 2020-06-26 VITALS — DIASTOLIC BLOOD PRESSURE: 69 MMHG | SYSTOLIC BLOOD PRESSURE: 154 MMHG

## 2020-06-26 LAB
BUN SERPL-MCNC: 26 MG/DL (ref 7–18)
CALCIUM SERPL-MCNC: 9.4 MG/DL (ref 8.8–10.2)
CHLORIDE SERPL-SCNC: 103 MEQ/L (ref 98–107)
CO2 SERPL-SCNC: 27 MEQ/L (ref 21–32)
CREAT SERPL-MCNC: 1.09 MG/DL (ref 0.55–1.3)
GFR SERPL CREATININE-BSD FRML MDRD: 50.8 ML/MIN/{1.73_M2} (ref 32–?)
GLUCOSE SERPL-MCNC: 127 MG/DL (ref 70–100)
HCT VFR BLD AUTO: 36.2 % (ref 36–47)
HGB BLD-MCNC: 11.3 G/DL (ref 12–15.5)
MCH RBC QN AUTO: 29.1 PG (ref 27–33)
MCHC RBC AUTO-ENTMCNC: 31.2 G/DL (ref 32–36.5)
MCV RBC AUTO: 93.3 FL (ref 80–96)
PLATELET # BLD AUTO: 415 10^3/UL (ref 150–450)
POTASSIUM SERPL-SCNC: 4.7 MEQ/L (ref 3.5–5.1)
RBC # BLD AUTO: 3.88 10^6/UL (ref 4–5.4)
SODIUM SERPL-SCNC: 135 MEQ/L (ref 136–145)
WBC # BLD AUTO: 6.9 10^3/UL (ref 4–10)

## 2020-06-26 RX ADMIN — HYDRALAZINE HYDROCHLORIDE SCH MG: 25 TABLET, FILM COATED ORAL at 06:20

## 2020-06-26 RX ADMIN — HYDRALAZINE HYDROCHLORIDE SCH MG: 25 TABLET, FILM COATED ORAL at 18:04

## 2020-06-26 RX ADMIN — GABAPENTIN SCH MG: 300 CAPSULE ORAL at 09:01

## 2020-06-26 RX ADMIN — DOCUSATE SODIUM,SENNOSIDES SCH TAB: 50; 8.6 TABLET, FILM COATED ORAL at 09:01

## 2020-06-26 RX ADMIN — PROBIOTIC PRODUCT - TAB SCH EA: TAB at 12:21

## 2020-06-26 RX ADMIN — FERROUS SULFATE TAB 325 MG (65 MG ELEMENTAL FE) SCH MG: 325 (65 FE) TAB at 09:02

## 2020-06-26 RX ADMIN — FUROSEMIDE SCH MG: 40 TABLET ORAL at 18:44

## 2020-06-26 RX ADMIN — MENTHOL, METHYL SALICYLATE SCH DOSE: 10; 15 CREAM TOPICAL at 09:02

## 2020-06-26 RX ADMIN — MENTHOL, METHYL SALICYLATE SCH DOSE: 10; 15 CREAM TOPICAL at 21:00

## 2020-06-26 RX ADMIN — HYDRALAZINE HYDROCHLORIDE SCH MG: 25 TABLET, FILM COATED ORAL at 12:22

## 2020-06-26 RX ADMIN — APIXABAN SCH MG: 2.5 TABLET, FILM COATED ORAL at 21:05

## 2020-06-26 RX ADMIN — ATORVASTATIN CALCIUM SCH MG: 20 TABLET, FILM COATED ORAL at 21:06

## 2020-06-26 RX ADMIN — LIDOCAINE SCH PATCH: 50 PATCH CUTANEOUS at 21:07

## 2020-06-26 RX ADMIN — ACETAMINOPHEN SCH MG: 500 TABLET ORAL at 15:47

## 2020-06-26 RX ADMIN — MECLIZINE HYDROCHLORIDE SCH MG: 25 TABLET ORAL at 21:06

## 2020-06-26 RX ADMIN — PROBIOTIC PRODUCT - TAB SCH EA: TAB at 18:04

## 2020-06-26 RX ADMIN — WHITE PETROLATUM SCH DOSE: 57; 17 PASTE TOPICAL at 21:06

## 2020-06-26 RX ADMIN — DOCUSATE SODIUM,SENNOSIDES SCH TAB: 50; 8.6 TABLET, FILM COATED ORAL at 21:05

## 2020-06-26 RX ADMIN — Medication SCH: at 09:00

## 2020-06-26 RX ADMIN — APIXABAN SCH MG: 2.5 TABLET, FILM COATED ORAL at 09:03

## 2020-06-26 RX ADMIN — MAGNESIUM OXIDE SCH MG: 400 TABLET ORAL at 09:03

## 2020-06-26 RX ADMIN — HYDRALAZINE HYDROCHLORIDE SCH MG: 25 TABLET, FILM COATED ORAL at 00:00

## 2020-06-26 RX ADMIN — ACETAMINOPHEN SCH MG: 500 TABLET ORAL at 09:01

## 2020-06-26 RX ADMIN — MENTHOL, METHYL SALICYLATE SCH DOSE: 10; 15 CREAM TOPICAL at 15:48

## 2020-06-26 RX ADMIN — LEVOTHYROXINE SODIUM SCH MCG: 75 TABLET ORAL at 06:21

## 2020-06-26 RX ADMIN — DULOXETINE SCH MG: 20 CAPSULE, DELAYED RELEASE ORAL at 09:01

## 2020-06-26 RX ADMIN — PROBIOTIC PRODUCT - TAB SCH EA: TAB at 09:01

## 2020-06-26 RX ADMIN — GABAPENTIN SCH MG: 300 CAPSULE ORAL at 21:05

## 2020-06-26 RX ADMIN — FERROUS SULFATE TAB 325 MG (65 MG ELEMENTAL FE) SCH MG: 325 (65 FE) TAB at 21:05

## 2020-06-26 RX ADMIN — WHITE PETROLATUM SCH DOSE: 57; 17 PASTE TOPICAL at 15:48

## 2020-06-26 RX ADMIN — MECLIZINE HYDROCHLORIDE SCH MG: 25 TABLET ORAL at 09:00

## 2020-06-26 RX ADMIN — PANTOPRAZOLE SODIUM SCH MG: 40 TABLET, DELAYED RELEASE ORAL at 09:01

## 2020-06-26 RX ADMIN — MECLIZINE HYDROCHLORIDE SCH MG: 25 TABLET ORAL at 15:47

## 2020-06-26 RX ADMIN — METOPROLOL TARTRATE SCH MG: 50 TABLET, FILM COATED ORAL at 06:20

## 2020-06-26 RX ADMIN — GABAPENTIN SCH MG: 300 CAPSULE ORAL at 15:47

## 2020-06-26 RX ADMIN — METOPROLOL TARTRATE SCH MG: 50 TABLET, FILM COATED ORAL at 21:06

## 2020-06-26 RX ADMIN — METOPROLOL TARTRATE SCH MG: 50 TABLET, FILM COATED ORAL at 14:00

## 2020-06-26 RX ADMIN — POLYETHYLENE GLYCOL 3350 SCH PKT: 17 POWDER, FOR SOLUTION ORAL at 09:00

## 2020-06-26 RX ADMIN — ACETAMINOPHEN SCH MG: 500 TABLET ORAL at 21:05

## 2020-06-26 RX ADMIN — WHITE PETROLATUM SCH DOSE: 57; 17 PASTE TOPICAL at 09:04

## 2020-06-27 VITALS — DIASTOLIC BLOOD PRESSURE: 64 MMHG | SYSTOLIC BLOOD PRESSURE: 141 MMHG

## 2020-06-27 VITALS — SYSTOLIC BLOOD PRESSURE: 141 MMHG | DIASTOLIC BLOOD PRESSURE: 66 MMHG

## 2020-06-27 VITALS — SYSTOLIC BLOOD PRESSURE: 140 MMHG | DIASTOLIC BLOOD PRESSURE: 60 MMHG

## 2020-06-27 RX ADMIN — FERROUS SULFATE TAB 325 MG (65 MG ELEMENTAL FE) SCH MG: 325 (65 FE) TAB at 20:42

## 2020-06-27 RX ADMIN — DOCUSATE SODIUM,SENNOSIDES SCH TAB: 50; 8.6 TABLET, FILM COATED ORAL at 09:10

## 2020-06-27 RX ADMIN — ATORVASTATIN CALCIUM SCH MG: 20 TABLET, FILM COATED ORAL at 20:41

## 2020-06-27 RX ADMIN — GABAPENTIN SCH MG: 300 CAPSULE ORAL at 20:41

## 2020-06-27 RX ADMIN — ACETAMINOPHEN SCH MG: 500 TABLET ORAL at 09:10

## 2020-06-27 RX ADMIN — LEVOTHYROXINE SODIUM SCH MCG: 75 TABLET ORAL at 05:43

## 2020-06-27 RX ADMIN — FUROSEMIDE SCH MG: 40 TABLET ORAL at 14:43

## 2020-06-27 RX ADMIN — MECLIZINE HYDROCHLORIDE SCH MG: 25 TABLET ORAL at 09:09

## 2020-06-27 RX ADMIN — METOPROLOL TARTRATE SCH MG: 50 TABLET, FILM COATED ORAL at 05:45

## 2020-06-27 RX ADMIN — ACETAMINOPHEN SCH MG: 500 TABLET ORAL at 15:52

## 2020-06-27 RX ADMIN — PROBIOTIC PRODUCT - TAB SCH EA: TAB at 17:29

## 2020-06-27 RX ADMIN — METOPROLOL TARTRATE SCH MG: 50 TABLET, FILM COATED ORAL at 22:53

## 2020-06-27 RX ADMIN — APIXABAN SCH MG: 2.5 TABLET, FILM COATED ORAL at 20:41

## 2020-06-27 RX ADMIN — MENTHOL, METHYL SALICYLATE SCH DOSE: 10; 15 CREAM TOPICAL at 20:43

## 2020-06-27 RX ADMIN — DOCUSATE SODIUM,SENNOSIDES SCH TAB: 50; 8.6 TABLET, FILM COATED ORAL at 20:43

## 2020-06-27 RX ADMIN — GABAPENTIN SCH MG: 300 CAPSULE ORAL at 15:52

## 2020-06-27 RX ADMIN — Medication SCH: at 09:11

## 2020-06-27 RX ADMIN — MENTHOL, METHYL SALICYLATE SCH DOSE: 10; 15 CREAM TOPICAL at 09:11

## 2020-06-27 RX ADMIN — HYDRALAZINE HYDROCHLORIDE SCH MG: 25 TABLET, FILM COATED ORAL at 00:13

## 2020-06-27 RX ADMIN — MAGNESIUM OXIDE SCH MG: 400 TABLET ORAL at 09:08

## 2020-06-27 RX ADMIN — MENTHOL, METHYL SALICYLATE SCH DOSE: 10; 15 CREAM TOPICAL at 15:52

## 2020-06-27 RX ADMIN — ACETAMINOPHEN SCH MG: 500 TABLET ORAL at 20:40

## 2020-06-27 RX ADMIN — METOPROLOL TARTRATE SCH MG: 50 TABLET, FILM COATED ORAL at 14:43

## 2020-06-27 RX ADMIN — FERROUS SULFATE TAB 325 MG (65 MG ELEMENTAL FE) SCH MG: 325 (65 FE) TAB at 09:09

## 2020-06-27 RX ADMIN — GABAPENTIN SCH MG: 300 CAPSULE ORAL at 09:08

## 2020-06-27 RX ADMIN — WHITE PETROLATUM SCH DOSE: 57; 17 PASTE TOPICAL at 09:11

## 2020-06-27 RX ADMIN — POLYETHYLENE GLYCOL 3350 SCH PKT: 17 POWDER, FOR SOLUTION ORAL at 09:08

## 2020-06-27 RX ADMIN — DULOXETINE SCH MG: 20 CAPSULE, DELAYED RELEASE ORAL at 09:08

## 2020-06-27 RX ADMIN — LIDOCAINE SCH PATCH: 50 PATCH CUTANEOUS at 20:42

## 2020-06-27 RX ADMIN — HYDRALAZINE HYDROCHLORIDE SCH MG: 25 TABLET, FILM COATED ORAL at 17:31

## 2020-06-27 RX ADMIN — FUROSEMIDE SCH MG: 40 TABLET ORAL at 09:12

## 2020-06-27 RX ADMIN — HYDRALAZINE HYDROCHLORIDE SCH MG: 25 TABLET, FILM COATED ORAL at 05:43

## 2020-06-27 RX ADMIN — APIXABAN SCH MG: 2.5 TABLET, FILM COATED ORAL at 09:09

## 2020-06-27 RX ADMIN — HYDRALAZINE HYDROCHLORIDE SCH MG: 25 TABLET, FILM COATED ORAL at 11:41

## 2020-06-27 RX ADMIN — PROBIOTIC PRODUCT - TAB SCH EA: TAB at 09:08

## 2020-06-27 RX ADMIN — PANTOPRAZOLE SODIUM SCH MG: 40 TABLET, DELAYED RELEASE ORAL at 09:08

## 2020-06-27 RX ADMIN — WHITE PETROLATUM SCH DOSE: 57; 17 PASTE TOPICAL at 20:43

## 2020-06-27 RX ADMIN — MECLIZINE HYDROCHLORIDE SCH MG: 25 TABLET ORAL at 20:41

## 2020-06-27 RX ADMIN — MECLIZINE HYDROCHLORIDE SCH MG: 25 TABLET ORAL at 15:52

## 2020-06-27 RX ADMIN — PROBIOTIC PRODUCT - TAB SCH EA: TAB at 11:41

## 2020-06-27 RX ADMIN — WHITE PETROLATUM SCH DOSE: 57; 17 PASTE TOPICAL at 15:52

## 2020-06-28 VITALS — DIASTOLIC BLOOD PRESSURE: 63 MMHG | SYSTOLIC BLOOD PRESSURE: 134 MMHG

## 2020-06-28 VITALS — SYSTOLIC BLOOD PRESSURE: 142 MMHG | DIASTOLIC BLOOD PRESSURE: 77 MMHG

## 2020-06-28 VITALS — SYSTOLIC BLOOD PRESSURE: 152 MMHG | DIASTOLIC BLOOD PRESSURE: 67 MMHG

## 2020-06-28 VITALS — SYSTOLIC BLOOD PRESSURE: 152 MMHG | DIASTOLIC BLOOD PRESSURE: 70 MMHG

## 2020-06-28 LAB
BUN SERPL-MCNC: 21 MG/DL (ref 7–18)
CALCIUM SERPL-MCNC: 8.5 MG/DL (ref 8.8–10.2)
CHLORIDE SERPL-SCNC: 102 MEQ/L (ref 98–107)
CO2 SERPL-SCNC: 29 MEQ/L (ref 21–32)
CREAT SERPL-MCNC: 1.07 MG/DL (ref 0.55–1.3)
GFR SERPL CREATININE-BSD FRML MDRD: 51.9 ML/MIN/{1.73_M2} (ref 32–?)
GLUCOSE SERPL-MCNC: 119 MG/DL (ref 70–100)
HCT VFR BLD AUTO: 33.4 % (ref 36–47)
HGB BLD-MCNC: 10.6 G/DL (ref 12–15.5)
MAGNESIUM SERPL-MCNC: 1.7 MG/DL (ref 1.8–2.4)
MCH RBC QN AUTO: 28.8 PG (ref 27–33)
MCHC RBC AUTO-ENTMCNC: 31.7 G/DL (ref 32–36.5)
MCV RBC AUTO: 90.8 FL (ref 80–96)
PLATELET # BLD AUTO: 369 10^3/UL (ref 150–450)
POTASSIUM SERPL-SCNC: 3.7 MEQ/L (ref 3.5–5.1)
RBC # BLD AUTO: 3.68 10^6/UL (ref 4–5.4)
SODIUM SERPL-SCNC: 137 MEQ/L (ref 136–145)
WBC # BLD AUTO: 4.8 10^3/UL (ref 4–10)

## 2020-06-28 RX ADMIN — MAGNESIUM OXIDE SCH MG: 400 TABLET ORAL at 08:45

## 2020-06-28 RX ADMIN — WHITE PETROLATUM SCH DOSE: 57; 17 PASTE TOPICAL at 16:04

## 2020-06-28 RX ADMIN — FERROUS SULFATE TAB 325 MG (65 MG ELEMENTAL FE) SCH MG: 325 (65 FE) TAB at 08:45

## 2020-06-28 RX ADMIN — HYDRALAZINE HYDROCHLORIDE SCH MG: 25 TABLET, FILM COATED ORAL at 00:00

## 2020-06-28 RX ADMIN — PROBIOTIC PRODUCT - TAB SCH EA: TAB at 08:47

## 2020-06-28 RX ADMIN — MENTHOL, METHYL SALICYLATE SCH DOSE: 10; 15 CREAM TOPICAL at 20:50

## 2020-06-28 RX ADMIN — MECLIZINE HYDROCHLORIDE SCH MG: 25 TABLET ORAL at 16:03

## 2020-06-28 RX ADMIN — GABAPENTIN SCH MG: 300 CAPSULE ORAL at 08:46

## 2020-06-28 RX ADMIN — DOCUSATE SODIUM,SENNOSIDES SCH TAB: 50; 8.6 TABLET, FILM COATED ORAL at 08:46

## 2020-06-28 RX ADMIN — POLYETHYLENE GLYCOL 3350 SCH PKT: 17 POWDER, FOR SOLUTION ORAL at 08:45

## 2020-06-28 RX ADMIN — ACETAMINOPHEN SCH MG: 500 TABLET ORAL at 08:48

## 2020-06-28 RX ADMIN — MENTHOL, METHYL SALICYLATE SCH DOSE: 10; 15 CREAM TOPICAL at 08:47

## 2020-06-28 RX ADMIN — METOPROLOL TARTRATE SCH MG: 50 TABLET, FILM COATED ORAL at 14:32

## 2020-06-28 RX ADMIN — GABAPENTIN SCH MG: 300 CAPSULE ORAL at 16:03

## 2020-06-28 RX ADMIN — HYDRALAZINE HYDROCHLORIDE SCH MG: 25 TABLET, FILM COATED ORAL at 11:53

## 2020-06-28 RX ADMIN — PANTOPRAZOLE SODIUM SCH MG: 40 TABLET, DELAYED RELEASE ORAL at 08:46

## 2020-06-28 RX ADMIN — PROBIOTIC PRODUCT - TAB SCH EA: TAB at 11:52

## 2020-06-28 RX ADMIN — LIDOCAINE SCH PATCH: 50 PATCH CUTANEOUS at 20:44

## 2020-06-28 RX ADMIN — PROBIOTIC PRODUCT - TAB SCH EA: TAB at 17:10

## 2020-06-28 RX ADMIN — HYDRALAZINE HYDROCHLORIDE SCH MG: 25 TABLET, FILM COATED ORAL at 06:12

## 2020-06-28 RX ADMIN — METOPROLOL TARTRATE SCH MG: 50 TABLET, FILM COATED ORAL at 06:11

## 2020-06-28 RX ADMIN — FUROSEMIDE SCH MG: 40 TABLET ORAL at 08:46

## 2020-06-28 RX ADMIN — MENTHOL, METHYL SALICYLATE SCH DOSE: 10; 15 CREAM TOPICAL at 16:04

## 2020-06-28 RX ADMIN — ACETAMINOPHEN SCH MG: 500 TABLET ORAL at 20:45

## 2020-06-28 RX ADMIN — HYDRALAZINE HYDROCHLORIDE SCH MG: 25 TABLET, FILM COATED ORAL at 17:11

## 2020-06-28 RX ADMIN — HYDRALAZINE HYDROCHLORIDE SCH MG: 25 TABLET, FILM COATED ORAL at 23:58

## 2020-06-28 RX ADMIN — MECLIZINE HYDROCHLORIDE SCH MG: 25 TABLET ORAL at 08:47

## 2020-06-28 RX ADMIN — FERROUS SULFATE TAB 325 MG (65 MG ELEMENTAL FE) SCH MG: 325 (65 FE) TAB at 20:44

## 2020-06-28 RX ADMIN — MECLIZINE HYDROCHLORIDE SCH MG: 25 TABLET ORAL at 20:44

## 2020-06-28 RX ADMIN — FUROSEMIDE SCH MG: 40 TABLET ORAL at 14:32

## 2020-06-28 RX ADMIN — APIXABAN SCH MG: 2.5 TABLET, FILM COATED ORAL at 20:44

## 2020-06-28 RX ADMIN — DULOXETINE SCH MG: 20 CAPSULE, DELAYED RELEASE ORAL at 08:47

## 2020-06-28 RX ADMIN — APIXABAN SCH MG: 2.5 TABLET, FILM COATED ORAL at 08:47

## 2020-06-28 RX ADMIN — DOCUSATE SODIUM,SENNOSIDES SCH TAB: 50; 8.6 TABLET, FILM COATED ORAL at 20:45

## 2020-06-28 RX ADMIN — METOPROLOL TARTRATE SCH MG: 50 TABLET, FILM COATED ORAL at 20:45

## 2020-06-28 RX ADMIN — ATORVASTATIN CALCIUM SCH MG: 20 TABLET, FILM COATED ORAL at 20:44

## 2020-06-28 RX ADMIN — LEVOTHYROXINE SODIUM SCH MCG: 75 TABLET ORAL at 06:12

## 2020-06-28 RX ADMIN — GABAPENTIN SCH MG: 300 CAPSULE ORAL at 20:44

## 2020-06-28 RX ADMIN — WHITE PETROLATUM SCH DOSE: 57; 17 PASTE TOPICAL at 20:50

## 2020-06-28 RX ADMIN — ACETAMINOPHEN SCH MG: 500 TABLET ORAL at 16:04

## 2020-06-28 RX ADMIN — WHITE PETROLATUM SCH DOSE: 57; 17 PASTE TOPICAL at 08:47

## 2020-06-28 RX ADMIN — Medication SCH: at 08:49

## 2020-06-29 VITALS — SYSTOLIC BLOOD PRESSURE: 160 MMHG | DIASTOLIC BLOOD PRESSURE: 72 MMHG

## 2020-06-29 VITALS — DIASTOLIC BLOOD PRESSURE: 70 MMHG | SYSTOLIC BLOOD PRESSURE: 159 MMHG

## 2020-06-29 VITALS — DIASTOLIC BLOOD PRESSURE: 70 MMHG | SYSTOLIC BLOOD PRESSURE: 157 MMHG

## 2020-06-29 LAB
BUN SERPL-MCNC: 19 MG/DL (ref 7–18)
CALCIUM SERPL-MCNC: 9.1 MG/DL (ref 8.8–10.2)
CHLORIDE SERPL-SCNC: 101 MEQ/L (ref 98–107)
CO2 SERPL-SCNC: 29 MEQ/L (ref 21–32)
CREAT SERPL-MCNC: 1.2 MG/DL (ref 0.55–1.3)
GFR SERPL CREATININE-BSD FRML MDRD: 45.5 ML/MIN/{1.73_M2} (ref 32–?)
GLUCOSE SERPL-MCNC: 105 MG/DL (ref 70–100)
HCT VFR BLD AUTO: 34.6 % (ref 36–47)
HGB BLD-MCNC: 11 G/DL (ref 12–15.5)
MAGNESIUM SERPL-MCNC: 2 MG/DL (ref 1.8–2.4)
MCH RBC QN AUTO: 28.9 PG (ref 27–33)
MCHC RBC AUTO-ENTMCNC: 31.8 G/DL (ref 32–36.5)
MCV RBC AUTO: 90.8 FL (ref 80–96)
PLATELET # BLD AUTO: 417 10^3/UL (ref 150–450)
POTASSIUM SERPL-SCNC: 3.8 MEQ/L (ref 3.5–5.1)
RBC # BLD AUTO: 3.81 10^6/UL (ref 4–5.4)
SODIUM SERPL-SCNC: 136 MEQ/L (ref 136–145)
WBC # BLD AUTO: 5.5 10^3/UL (ref 4–10)

## 2020-06-29 RX ADMIN — DOCUSATE SODIUM,SENNOSIDES SCH TAB: 50; 8.6 TABLET, FILM COATED ORAL at 20:55

## 2020-06-29 RX ADMIN — GABAPENTIN SCH MG: 300 CAPSULE ORAL at 09:32

## 2020-06-29 RX ADMIN — HYDRALAZINE HYDROCHLORIDE SCH MG: 25 TABLET, FILM COATED ORAL at 13:27

## 2020-06-29 RX ADMIN — WHITE PETROLATUM SCH DOSE: 57; 17 PASTE TOPICAL at 21:02

## 2020-06-29 RX ADMIN — Medication SCH: at 09:34

## 2020-06-29 RX ADMIN — MENTHOL, METHYL SALICYLATE SCH DOSE: 10; 15 CREAM TOPICAL at 21:02

## 2020-06-29 RX ADMIN — ACETAMINOPHEN SCH MG: 500 TABLET ORAL at 09:32

## 2020-06-29 RX ADMIN — PANTOPRAZOLE SODIUM SCH MG: 40 TABLET, DELAYED RELEASE ORAL at 09:33

## 2020-06-29 RX ADMIN — GABAPENTIN SCH MG: 300 CAPSULE ORAL at 15:37

## 2020-06-29 RX ADMIN — HYDRALAZINE HYDROCHLORIDE SCH MG: 25 TABLET, FILM COATED ORAL at 23:53

## 2020-06-29 RX ADMIN — MENTHOL, METHYL SALICYLATE SCH DOSE: 10; 15 CREAM TOPICAL at 09:35

## 2020-06-29 RX ADMIN — PROBIOTIC PRODUCT - TAB SCH EA: TAB at 17:49

## 2020-06-29 RX ADMIN — MECLIZINE HYDROCHLORIDE SCH MG: 25 TABLET ORAL at 20:55

## 2020-06-29 RX ADMIN — MAGNESIUM OXIDE SCH MG: 400 TABLET ORAL at 09:33

## 2020-06-29 RX ADMIN — APIXABAN SCH MG: 2.5 TABLET, FILM COATED ORAL at 09:32

## 2020-06-29 RX ADMIN — HYDRALAZINE HYDROCHLORIDE SCH MG: 25 TABLET, FILM COATED ORAL at 17:42

## 2020-06-29 RX ADMIN — WHITE PETROLATUM SCH DOSE: 57; 17 PASTE TOPICAL at 09:34

## 2020-06-29 RX ADMIN — LIDOCAINE SCH PATCH: 50 PATCH CUTANEOUS at 21:01

## 2020-06-29 RX ADMIN — ACETAMINOPHEN SCH MG: 500 TABLET ORAL at 20:56

## 2020-06-29 RX ADMIN — FERROUS SULFATE TAB 325 MG (65 MG ELEMENTAL FE) SCH MG: 325 (65 FE) TAB at 20:55

## 2020-06-29 RX ADMIN — WHITE PETROLATUM SCH DOSE: 57; 17 PASTE TOPICAL at 15:38

## 2020-06-29 RX ADMIN — FERROUS SULFATE TAB 325 MG (65 MG ELEMENTAL FE) SCH MG: 325 (65 FE) TAB at 09:33

## 2020-06-29 RX ADMIN — METOPROLOL TARTRATE SCH MG: 50 TABLET, FILM COATED ORAL at 21:02

## 2020-06-29 RX ADMIN — POLYETHYLENE GLYCOL 3350 SCH PKT: 17 POWDER, FOR SOLUTION ORAL at 09:32

## 2020-06-29 RX ADMIN — MENTHOL, METHYL SALICYLATE SCH DOSE: 10; 15 CREAM TOPICAL at 15:38

## 2020-06-29 RX ADMIN — FUROSEMIDE SCH MG: 40 TABLET ORAL at 09:33

## 2020-06-29 RX ADMIN — HYDROCORTISONE PRN DOSE: 25 OINTMENT TOPICAL at 21:01

## 2020-06-29 RX ADMIN — PROBIOTIC PRODUCT - TAB SCH EA: TAB at 13:27

## 2020-06-29 RX ADMIN — ACETAMINOPHEN SCH MG: 500 TABLET ORAL at 15:37

## 2020-06-29 RX ADMIN — APIXABAN SCH MG: 2.5 TABLET, FILM COATED ORAL at 20:55

## 2020-06-29 RX ADMIN — METOPROLOL TARTRATE SCH MG: 50 TABLET, FILM COATED ORAL at 06:17

## 2020-06-29 RX ADMIN — LEVOTHYROXINE SODIUM SCH MCG: 75 TABLET ORAL at 06:17

## 2020-06-29 RX ADMIN — MECLIZINE HYDROCHLORIDE SCH MG: 25 TABLET ORAL at 15:37

## 2020-06-29 RX ADMIN — HYDRALAZINE HYDROCHLORIDE SCH MG: 25 TABLET, FILM COATED ORAL at 06:17

## 2020-06-29 RX ADMIN — MECLIZINE HYDROCHLORIDE SCH MG: 25 TABLET ORAL at 09:32

## 2020-06-29 RX ADMIN — DOCUSATE SODIUM,SENNOSIDES SCH TAB: 50; 8.6 TABLET, FILM COATED ORAL at 09:32

## 2020-06-29 RX ADMIN — GABAPENTIN SCH MG: 300 CAPSULE ORAL at 20:56

## 2020-06-29 RX ADMIN — METOPROLOL TARTRATE SCH MG: 50 TABLET, FILM COATED ORAL at 15:37

## 2020-06-29 RX ADMIN — ATORVASTATIN CALCIUM SCH MG: 20 TABLET, FILM COATED ORAL at 20:56

## 2020-06-29 RX ADMIN — PROBIOTIC PRODUCT - TAB SCH EA: TAB at 09:32

## 2020-06-29 RX ADMIN — DULOXETINE SCH MG: 20 CAPSULE, DELAYED RELEASE ORAL at 09:33

## 2020-06-29 NOTE — IPNPDOC
PM&R Progress Note


DATE OF SERVICE:  Jun 29, 2020


Physiatrist Progress Note


Subjective:


Patient reporting her right groin pain is much better today and that she thinks 

she is doing better in therapy. 





REVIEW OF SYSTEMS: The following is a completed review of systems and has been 

reviewed. Review of systems otherwise unremarkable.


PAIN: Patient self reports right hip pain


EYES: No recent vision changes


EARS, NOSE, & THROAT:+intermittent dysphagia solids and liquids (chronic)


CARDIOVASCULAR: Denies chest pain or palpitations


PULMONARY: Denies shortness of breath


GASTROINTESTINAL: +constipation (resolved)


GENITOURINARY:denies dysuria


MUSCULOSKELETAL: right hip fracture


NEUROLOGICAL:denies paresthesias


HEMATOLOGICAL: denies easy bruising, +anemia (improving)


SKIN:right hip incision


PSYCHIATRIC: Unremarkable


All other review of systems found to be negative.








PHYSICAL EXAMINATION:


VITAL SIGNS: Please see below.


GENERAL: Pleasant and cooperative. No acute distress. 


HEENT: PERRL. Extraocular movements intact. Clear conjunctiva


CARDIOVASCULAR: Regular rate and rhythm. No murmurs, rubs, or gallops


LUNGS: Clear to auscultation bilaterally. No wheezes. No rhonchi


ABDOMEN: Soft, nontender, nondistended. Positive bowel sounds. Normal active 

bowel sounds


NEUROLOGICAL: Alert and oriented times three. Cranial nerves II through XII 

grossly intact. Sensation grossly intact


no tremor in bilat UE


EXTREMITIES: 5\5 strength bilateral upper extremities. 4+\5 strength right ankle

DF/EHL and PF (limited due to surgery) 4+/5 strength in left lower extremity. 


(-) homans' bilat


RLE mild edema 


SKIN: right hip incision c/d/i (staples removed), blanchable sacral erythema











ASSESSMENT:85-year-old F with past medical history of Afib who presents status 

post fall with right femur fracture





PLAN:


1. PT/OT- advance gait and ADLs, strengthen/stretch/maintain ROM all 4 limbs-

ambulating with RW, however noted decline over the weekend due to increased pain




-SLP- eval for swallow, patient endorsing intermittent dysphagia- level 2 


2. Ortho- s/p right femur fracture and ORIF, ortho consulted- WBAT


-right medial foot pain reported on inpatient with follow-up Xray negative for 

gross fracture, however due to bone demineralization a small fracture was unable

to be ruled out-ortho consulted, recs appreciated, c/u WBAT


--repeat hip X-ray 6-25-20 negative, patient today reporting improvement in pain




3. Cardiac: Afib s/p ablation on eliquis and metoprolol


-HTN c/u amlodipine,  Micardis on hold due to ALVERTO,  c/u hydralazine for elevated

BPs and increased dose of metoprolol to 50mg q8h - medicine consulted to assist 

in overall management


- CAD c/u ASA


-HLD c/u statin


-edema- patient started on oral lasix over the weekend for worsening edema, 

patient has mild RLE edema today, denies shortness of breath


4. Resp: c/u supplemental 02, encourage incentive spirometry


5. Neuro: chronic vertigo c/u meclizine, will resume home dose now that she will

be on lower dose of opioids


6. Renal- hx of CKD with new ALVERTO resolved following s/p blood transfusion , 

lasix started over the weekend, c.u and monitor


7. Heme: post-op anemia, s/p 1 unit rbcs, improving 


-patient had some blood per rectum 6-10-20  while attempting to have a bowel 

movement- resolved and no drop in Hgb 


-c/u iron


8. DVT ppx: c/u eliquis


-admission Doppler negative for DVT, and repeat RLE Doppler also negative 


9. GI ppx: Protonix


- c/u  bowel meds


10. : hx of incontinence, now with retention, holding Ditropan, UA + for LE 

and bacteria however Ucx negative, s/p 3 day course of levaquin 


11. Pain: Tylenol, c/u scheduled oxycodone 5mg, patient reporting her groin pain

is much better today,  c/u gabapentin  300mg tid


-increased cymbalta  to 40mg daily


-c/u Lidoderm patch to right groin qHS


12. Endo: hypothyroidism- c/u synthroid


13. Psych-c/u buspar 5mg daily and Cymbalta lowered to 20mg 


14. Hyponatremia- improving, c/u fluid restriction and monitor, no concerning 

neuro/GI symptoms


15. Leukocytosis- resolved, likely due to post-op inflammation 


16. RUE tremor- resolved, c/u lower dose of cymbalta as may have contributed, 

c/u  menthol salicylate rub for pain relief


17. Dispo: 6-30-20 to home, progressing slowly towards goals


Allergies


Coded Allergies:  


     Penicillins (Unverified  Allergy, Unknown, 6/5/20)


     latex (Unverified  Allergy, Unknown, 6/5/20)


     morphine (Unverified  Allergy, Unknown, 6/7/20)


   


   CONFIRMED DOSES ON 6/5 NO REACTION





Vital Signs





Vital Signs








  Date Time  Temp Pulse Resp B/P (MAP) Pulse Ox O2 Delivery O2 Flow Rate FiO2


 


6/29/20 13:27    169/72    


 


6/29/20 10:18   18     


 


6/29/20 06:17  62      


 


6/29/20 06:00 97.5     Room Air  


 


6/28/20 20:00     94   


 


6/25/20 21:32       2.0 











Laboratory Data


CBC/BMP


Laboratory Tests


6/29/20 07:01








Labs 24H


Laboratory Tests 2


6/29/20 07:01: 


Nucleated Red Blood Cells % (auto) 0.0, Anion Gap 6L, Glomerular Filtration Rate

45.5, Calcium Level 9.1, Magnesium Level 2.0





Current Medications


Current Medications





Current Medications








 Medications


  (Trade)  Dose


 Ordered  Sig/Carlos


 Route


 PRN Reason  Start Time


 Stop Time Status Last Admin


Dose Admin


 


 Acetaminophen


  (Tylenol Tab)  1,000 mg  Q8H


 PO


   6/8/20 22:00


 6/9/20 15:16 DC 6/9/20 13:20





 


 Acetaminophen


  (Tylenol Tab)  1,000 mg  TID


 PO


   6/9/20 21:00


    6/29/20 09:32





 


 Amlodipine


 Besylate


  (Norvasc)  5 mg  BID


 PO


   6/8/20 21:00


    6/29/20 09:33





 


 Apixaban


  (Eliquis)  2.5 mg  BID


 PO


   6/8/20 21:00


    6/29/20 09:32





 


 Atorvastatin


 Calcium


  (Lipitor)  20 mg  QHS


 PO


   6/8/20 21:00


    6/28/20 20:44





 


 Bisacodyl


  (Dulcolax


 Suppository)  10 mg  DAILYPRN  PRN


 VT


 CONSTIPATION  6/10/20 13:00


 6/10/20 12:53 DC  





 


 Bisacodyl


  (Dulcolax


 Suppository)  10 mg  DAILYPRN  PRN


 VT


 CONSTIPATION  6/8/20 14:45


     





 


 Buspirone HCl


  (Buspar)  5 mg  BID


 PO


   6/8/20 21:00


 6/10/20 13:38 DC 6/10/20 09:04





 


 Buspirone HCl


  (Buspar)  5 mg  DAILY


 PO


   6/11/20 09:00


    6/29/20 09:33





 


 Duloxetine HCl


  (Cymbalta)  20 mg  DAILY


 PO


   6/26/20 09:00


    6/29/20 09:33





 


 Duloxetine HCl


  (Cymbalta)  30 mg  DAILY


 PO


   6/10/20 09:00


 6/22/20 13:50 DC 6/22/20 09:04





 


 Duloxetine HCl


  (Cymbalta)  40 mg  DAILY


 PO


   6/23/20 09:00


 6/25/20 14:05 DC 6/25/20 08:51





 


 Ferrous Sulfate


  (Ferrous Sulfate)  325 mg  BID


 PO


   6/9/20 21:00


    6/29/20 09:33





 


 Furosemide


  (Lasix)  40 mg  BID@0900,1500


 PO


   6/27/20 15:00


 6/29/20 06:34 DC 6/28/20 14:32





 


 Furosemide


  (Lasix)  40 mg  DAILY


 PO


   6/26/20 18:15


 6/27/20 11:49 DC 6/27/20 09:12





 


 Furosemide


  (Lasix)  40 mg  DAILY


 PO


   6/29/20 09:00


    6/29/20 09:33





 


 Gabapentin


  (Neurontin)  100 mg  BID


 PO


   6/8/20 21:00


 6/21/20 08:42 DC 6/21/20 08:34





 


 Gabapentin


  (Neurontin)  200 mg  BID


 PO


   6/21/20 21:00


 6/22/20 13:50 DC 6/22/20 09:06





 


 Gabapentin


  (Neurontin)  300 mg  TID


 PO


   6/22/20 16:00


    6/29/20 09:32





 


 Hydralazine HCl


  (Apresoline)  25 mg  Q6H


 PO


   6/18/20 12:00


    6/29/20 13:27





 


 Hydrocortisone


  (Hydrocortisone


 2.5% Ointment)  right


 antecubital


 area  BID  PRN


 TOP


 itch  6/16/20 14:00


    6/16/20 16:02





 


 Lactobacillus


 Acidophilus


  (Bacid)  1 ea  WM


 PO


   6/10/20 12:30


    6/29/20 13:27





 


 Levofloxacin


  (Levaquin)  250 mg  DAILY@06


 PO


   6/10/20 06:00


 6/14/20 04:37 DC 6/13/20 05:50





 


 Levothyroxine


 Sodium


  (Synthroid)  75 mcg  DAILY@06


 PO


   6/9/20 06:00


    6/29/20 06:17





 


 Lidocaine


  (Lidoderm Patch)  1 patch  QHS


 TD


   6/17/20 21:00


    6/28/20 20:44





 


 Magnesium Oxide


  (Mag-Ox)  400 mg  DAILY


 PO


   6/9/20 09:00


    6/29/20 09:33





 


 Meclizine HCl


  (Antivert)  12.5 mg  TID


 PO


   6/24/20 16:00


 6/25/20 10:45 DC 6/25/20 08:52





 


 Meclizine HCl


  (Antivert)  25 mg  TID


 PO


   6/25/20 16:00


    6/29/20 09:32





 


 Meclizine HCl


  (Antivert)  25 mg  TID


 PO


   6/8/20 16:00


 6/24/20 10:27 DC 6/24/20 08:22





 


 Menthol/Methyl


 Salicylate


  (Bengay Cream)  RUE  TID


 TOP


   6/25/20 16:00


    6/29/20 09:35





 


 Metoprolol


 Tartrate


  (Lopressor)  50 mg  BID


 PO


   6/8/20 21:00


 6/22/20 10:17 DC 6/22/20 09:04





 


 Metoprolol


 Tartrate


  (Lopressor)  50 mg  Q8H


 PO


   6/22/20 22:00


    6/29/20 06:17





 


 Miscellaneous


  (Unresolved


 Clarification


 Entry)  SEE LABEL


 COMMENTS  DAILY


 XX


   6/20/20 09:00


 6/21/20 11:02 DC  





 


 Miscellaneous


  (Unresolved


 Clarification


 Entry)  SEE LABEL


 COMMENTS  DAILY


 XX


   6/27/20 09:00


 6/27/20 12:13 DC  





 


 Non-Formulary


 Medication


  (** See Comment


 Field Below **)  REMOVE


 LIDODERM


 PATCH  DAILY@0900


 XX


   6/18/20 09:00


    6/29/20 09:34





 


 Ondansetron HCl


  (Zofran Odt)  4 mg  Q4HP  PRN


 PO


 NAUSEA OR VOMITING  6/8/20 14:45


    6/15/20 21:19





 


 Oxybutynin


 Chloride


  (Ditropan)  5 mg  BID


 PO


   6/8/20 21:00


 6/9/20 13:50 DC 6/9/20 08:22





 


 Oxycodone HCl


  (Roxicodone,


 Oxyir)  5 mg  0600,1000,1500,2000


 PO


   6/25/20 15:00


    6/29/20 09:34





 


 Oxycodone HCl


  (Roxicodone,


 Oxyir)  5 mg  0800,1200,1600,2100


 PO


   6/22/20 16:00


 6/24/20 10:27 DC 6/24/20 08:23





 


 Oxycodone HCl


  (Roxicodone,


 Oxyir)  5 mg  Q4HP  PRN


 PO


 PAIN  6/8/20 14:45


 6/22/20 13:50 DC 6/22/20 06:14





 


 Oxycodone HCl


  (Roxicodone,


 Oxyir)  7.5 mg  0600,1000,1500,2000


 PO


   6/24/20 10:00


 6/25/20 12:20 DC 6/25/20 08:50





 


 Pantoprazole


 Sodium


  (Protonix)  40 mg  DAILY


 PO


   6/9/20 09:00


    6/29/20 09:33





 


 Polyethylene


 Glycol


  (Miralax)  1 pkt  DAILY


 PO


   6/10/20 09:00


    6/29/20 09:32





 


 Senna/Docusate


 Sodium


  (Senokot S)  2 tab  BID


 PO


   6/8/20 21:00


    6/29/20 09:32





 


 Sodium


 Biphosphate/


 Sodium Phosphate


  (Fleet Enema)  1 ea  DAILYPRN  PRN


 VT


 CONSTIPATION  6/10/20 13:00


    6/10/20 21:49





 


 Telmisartan


  (Micardis)  20 mg  QHS


 PO


   6/8/20 21:00


 6/9/20 10:25 DC 6/8/20 21:06




















FARAZ SHIN MD         Jun 29, 2020 13:57

## 2020-06-30 VITALS — SYSTOLIC BLOOD PRESSURE: 154 MMHG | DIASTOLIC BLOOD PRESSURE: 58 MMHG

## 2020-06-30 VITALS — SYSTOLIC BLOOD PRESSURE: 180 MMHG | DIASTOLIC BLOOD PRESSURE: 62 MMHG

## 2020-06-30 RX ADMIN — APIXABAN SCH MG: 2.5 TABLET, FILM COATED ORAL at 08:24

## 2020-06-30 RX ADMIN — Medication SCH: at 08:26

## 2020-06-30 RX ADMIN — METOPROLOL TARTRATE SCH MG: 50 TABLET, FILM COATED ORAL at 05:34

## 2020-06-30 RX ADMIN — LEVOTHYROXINE SODIUM SCH MCG: 75 TABLET ORAL at 05:33

## 2020-06-30 RX ADMIN — PROBIOTIC PRODUCT - TAB SCH EA: TAB at 08:24

## 2020-06-30 RX ADMIN — DULOXETINE SCH MG: 20 CAPSULE, DELAYED RELEASE ORAL at 08:24

## 2020-06-30 RX ADMIN — HYDRALAZINE HYDROCHLORIDE SCH MG: 25 TABLET, FILM COATED ORAL at 05:33

## 2020-06-30 RX ADMIN — POLYETHYLENE GLYCOL 3350 SCH PKT: 17 POWDER, FOR SOLUTION ORAL at 08:23

## 2020-06-30 RX ADMIN — DOCUSATE SODIUM,SENNOSIDES SCH TAB: 50; 8.6 TABLET, FILM COATED ORAL at 08:24

## 2020-06-30 RX ADMIN — MENTHOL, METHYL SALICYLATE SCH DOSE: 10; 15 CREAM TOPICAL at 08:25

## 2020-06-30 RX ADMIN — WHITE PETROLATUM SCH DOSE: 57; 17 PASTE TOPICAL at 08:25

## 2020-06-30 RX ADMIN — FERROUS SULFATE TAB 325 MG (65 MG ELEMENTAL FE) SCH MG: 325 (65 FE) TAB at 08:25

## 2020-06-30 RX ADMIN — MECLIZINE HYDROCHLORIDE SCH MG: 25 TABLET ORAL at 08:25

## 2020-06-30 RX ADMIN — GABAPENTIN SCH MG: 300 CAPSULE ORAL at 08:24

## 2020-06-30 RX ADMIN — ACETAMINOPHEN SCH MG: 500 TABLET ORAL at 08:25

## 2020-06-30 RX ADMIN — MAGNESIUM OXIDE SCH MG: 400 TABLET ORAL at 08:24

## 2020-06-30 RX ADMIN — PANTOPRAZOLE SODIUM SCH MG: 40 TABLET, DELAYED RELEASE ORAL at 08:24

## 2020-06-30 RX ADMIN — FUROSEMIDE SCH MG: 40 TABLET ORAL at 08:25

## 2020-06-30 NOTE — IPNPDOC
PM&R Progress Note


Physiatrist Progress Note


DATE OF ADMISSION: Jun 8, 2020 at 14:41 





INPATIENT REHABILITATION ADMISSION DAY: # 





SUBJECTIVE: Patient is a -year-old  with .





ALLERGIES: See Below





MEDICATIONS: Reviewed, see below.





OBJECTIVE:


VITAL SIGNS: Please see below.


PHYSICAL EXAMINATION:


GENERAL: [Cachectic, well developed, sitting up in bed, no acute distress].


HEENT: [Normocephalic, atraumatic]. [No facial droop]. [Poor dentition, missing 

teeth. PERRL, EOMI].


CARDIOVASCULAR: [S1, S2, irregular rate]. [No lower limb edema or calf 

tenderness].


LUNGS: [Decreased breath sounds, coarse throughout].


ABDOMEN: [Soft, nontender, nondistended. Normoactive bowel sounds throughout].


MUSCULOSKELETAL: MMT: /5 strength proximally bilateral shoulder abduction, 

forward flexion and bilateral hip flexion. /5 strength bilateral elbow flexion, 

knee flexion, /5 bilateral elbow extension and knee extension. /5 , 

dorsiflexion, plantar flexion. 


NEUROLOGICAL: [Alert and oriented times three]. [Answers all question 

appropriately].


SKIN: .





LABORATORY DATA: Reviewed. Please see below.





MICROBIOLOGY: Please see below.





IMAGING: 





ASSESSMENT AND PLAN:


1. .





2. .





3. .





TIME SPENT: Chart Review, examination and documentation  minutes.


Allergies


Coded Allergies:  


     Penicillins (Unverified  Allergy, Unknown, 6/5/20)


     latex (Unverified  Allergy, Unknown, 6/5/20)


     morphine (Unverified  Allergy, Unknown, 6/7/20)


   


   CONFIRMED DOSES ON 6/5 NO REACTION





Vital Signs





Vital Signs








  Date Time  Temp Pulse Resp B/P (MAP) Pulse Ox O2 Delivery O2 Flow Rate FiO2


 


6/30/20 10:21    154/58 (90)    


 


6/30/20 09:58   14   Room Air  


 


6/30/20 08:24  69      


 


6/30/20 06:00 97.4    90   


 


6/25/20 21:32       2.0 











Laboratory Data


Labs 24H


Laboratory Tests 2


6/30/20 08:35: 





Current Medications


Current Medications





Current Medications








 Medications


  (Trade)  Dose


 Ordered  Sig/Carlos


 Route


 PRN Reason  Start Time


 Stop Time Status Last Admin


Dose Admin


 


 Acetaminophen


  (Tylenol Tab)  1,000 mg  Q8H


 PO


   6/8/20 22:00


 6/9/20 15:16 DC 6/9/20 13:20





 


 Acetaminophen


  (Tylenol Tab)  1,000 mg  TID


 PO


   6/9/20 21:00


    6/30/20 08:25





 


 Amlodipine


 Besylate


  (Norvasc)  5 mg  BID


 PO


   6/8/20 21:00


    6/30/20 08:24





 


 Apixaban


  (Eliquis)  2.5 mg  BID


 PO


   6/8/20 21:00


    6/30/20 08:24





 


 Atorvastatin


 Calcium


  (Lipitor)  20 mg  QHS


 PO


   6/8/20 21:00


    6/29/20 20:56





 


 Bisacodyl


  (Dulcolax


 Suppository)  10 mg  DAILYPRN  PRN


 RI


 CONSTIPATION  6/10/20 13:00


 6/10/20 12:53 DC  





 


 Bisacodyl


  (Dulcolax


 Suppository)  10 mg  DAILYPRN  PRN


 RI


 CONSTIPATION  6/8/20 14:45


     





 


 Buspirone HCl


  (Buspar)  5 mg  BID


 PO


   6/8/20 21:00


 6/10/20 13:38 DC 6/10/20 09:04





 


 Buspirone HCl


  (Buspar)  5 mg  DAILY


 PO


   6/11/20 09:00


    6/30/20 08:24





 


 Duloxetine HCl


  (Cymbalta)  20 mg  DAILY


 PO


   6/26/20 09:00


    6/30/20 08:24





 


 Duloxetine HCl


  (Cymbalta)  30 mg  DAILY


 PO


   6/10/20 09:00


 6/22/20 13:50 DC 6/22/20 09:04





 


 Duloxetine HCl


  (Cymbalta)  40 mg  DAILY


 PO


   6/23/20 09:00


 6/25/20 14:05 DC 6/25/20 08:51





 


 Ferrous Sulfate


  (Ferrous Sulfate)  325 mg  BID


 PO


   6/9/20 21:00


    6/30/20 08:25





 


 Furosemide


  (Lasix)  40 mg  BID@0900,1500


 PO


   6/27/20 15:00


 6/29/20 06:34 DC 6/28/20 14:32





 


 Furosemide


  (Lasix)  40 mg  DAILY


 PO


   6/26/20 18:15


 6/27/20 11:49 DC 6/27/20 09:12





 


 Furosemide


  (Lasix)  40 mg  DAILY


 PO


   6/29/20 09:00


    6/30/20 08:25





 


 Gabapentin


  (Neurontin)  100 mg  BID


 PO


   6/8/20 21:00


 6/21/20 08:42 DC 6/21/20 08:34





 


 Gabapentin


  (Neurontin)  200 mg  BID


 PO


   6/21/20 21:00


 6/22/20 13:50 DC 6/22/20 09:06





 


 Gabapentin


  (Neurontin)  300 mg  TID


 PO


   6/22/20 16:00


    6/30/20 08:24





 


 Hydralazine HCl


  (Apresoline)  25 mg  Q6H


 PO


   6/18/20 12:00


    6/30/20 05:33





 


 Hydrocortisone


  (Hydrocortisone


 2.5% Ointment)  right


 antecubital


 area  BID  PRN


 TOP


 itch  6/16/20 14:00


    6/29/20 21:01





 


 Lactobacillus


 Acidophilus


  (Bacid)  1 ea  WM


 PO


   6/10/20 12:30


    6/30/20 08:24





 


 Levofloxacin


  (Levaquin)  250 mg  DAILY@06


 PO


   6/10/20 06:00


 6/14/20 04:37 DC 6/13/20 05:50





 


 Levothyroxine


 Sodium


  (Synthroid)  75 mcg  DAILY@06


 PO


   6/9/20 06:00


    6/30/20 05:33





 


 Lidocaine


  (Lidoderm Patch)  1 patch  QHS


 TD


   6/17/20 21:00


    6/29/20 21:01





 


 Magnesium Oxide


  (Mag-Ox)  400 mg  DAILY


 PO


   6/9/20 09:00


    6/30/20 08:24





 


 Meclizine HCl


  (Antivert)  12.5 mg  TID


 PO


   6/24/20 16:00


 6/25/20 10:45 DC 6/25/20 08:52





 


 Meclizine HCl


  (Antivert)  25 mg  TID


 PO


   6/25/20 16:00


    6/30/20 08:25





 


 Meclizine HCl


  (Antivert)  25 mg  TID


 PO


   6/8/20 16:00


 6/24/20 10:27 DC 6/24/20 08:22





 


 Menthol/Methyl


 Salicylate


  (Bengay Cream)  RUE  TID


 TOP


   6/25/20 16:00


    6/30/20 08:25





 


 Metoprolol


 Tartrate


  (Lopressor)  50 mg  BID


 PO


   6/8/20 21:00


 6/22/20 10:17 DC 6/22/20 09:04





 


 Metoprolol


 Tartrate


  (Lopressor)  50 mg  Q8H


 PO


   6/22/20 22:00


    6/30/20 05:34





 


 Miscellaneous


  (Unresolved


 Clarification


 Entry)  SEE LABEL


 COMMENTS  DAILY


 XX


   6/20/20 09:00


 6/21/20 11:02 DC  





 


 Miscellaneous


  (Unresolved


 Clarification


 Entry)  SEE LABEL


 COMMENTS  DAILY


 XX


   6/27/20 09:00


 6/27/20 12:13 DC  





 


 Non-Formulary


 Medication


  (** See Comment


 Field Below **)  REMOVE


 LIDODERM


 PATCH  DAILY@0900


 XX


   6/18/20 09:00


    6/30/20 08:26





 


 Ondansetron HCl


  (Zofran Odt)  4 mg  Q4HP  PRN


 PO


 NAUSEA OR VOMITING  6/8/20 14:45


    6/15/20 21:19





 


 Oxybutynin


 Chloride


  (Ditropan)  5 mg  BID


 PO


   6/8/20 21:00


 6/9/20 13:50 DC 6/9/20 08:22





 


 Oxycodone HCl


  (Roxicodone,


 Oxyir)  5 mg  0600,1000,1500,2000


 PO


   6/25/20 15:00


    6/30/20 09:58





 


 Oxycodone HCl


  (Roxicodone,


 Oxyir)  5 mg  0800,1200,1600,2100


 PO


   6/22/20 16:00


 6/24/20 10:27 DC 6/24/20 08:23





 


 Oxycodone HCl


  (Roxicodone,


 Oxyir)  5 mg  Q4HP  PRN


 PO


 PAIN  6/8/20 14:45


 6/22/20 13:50 DC 6/22/20 06:14





 


 Oxycodone HCl


  (Roxicodone,


 Oxyir)  7.5 mg  0600,1000,1500,2000


 PO


   6/24/20 10:00


 6/25/20 12:20 DC 6/25/20 08:50





 


 Pantoprazole


 Sodium


  (Protonix)  40 mg  DAILY


 PO


   6/9/20 09:00


    6/30/20 08:24





 


 Polyethylene


 Glycol


  (Miralax)  1 pkt  DAILY


 PO


   6/10/20 09:00


    6/30/20 08:23





 


 Senna/Docusate


 Sodium


  (Senokot S)  2 tab  BID


 PO


   6/8/20 21:00


    6/30/20 08:24





 


 Sodium


 Biphosphate/


 Sodium Phosphate


  (Fleet Enema)  1 ea  DAILYPRN  PRN


 RI


 CONSTIPATION  6/10/20 13:00


    6/10/20 21:49





 


 Telmisartan


  (Micardis)  20 mg  QHS


 PO


   6/8/20 21:00


 6/9/20 10:25 DC 6/8/20 21:06




















FARAZ SHIN MD         Jun 30, 2020 10:26

## 2020-07-02 ENCOUNTER — HOSPITAL ENCOUNTER (OUTPATIENT)
Dept: HOSPITAL 53 - SKLAB5 | Age: 85
End: 2020-07-02
Attending: INTERNAL MEDICINE

## 2020-07-02 DIAGNOSIS — D64.9: ICD-10-CM

## 2020-07-02 DIAGNOSIS — I10: Primary | ICD-10-CM

## 2020-07-02 LAB
HCT VFR BLD AUTO: 32.9 % (ref 36–47)
HGB BLD-MCNC: 10.5 G/DL (ref 12–15.5)
MCH RBC QN AUTO: 29.2 PG (ref 27–33)
MCHC RBC AUTO-ENTMCNC: 31.9 G/DL (ref 32–36.5)
MCV RBC AUTO: 91.4 FL (ref 80–96)
PLATELET # BLD AUTO: 328 10^3/UL (ref 150–450)
RBC # BLD AUTO: 3.6 10^6/UL (ref 4–5.4)
WBC # BLD AUTO: 5.4 10^3/UL (ref 4–10)

## 2020-07-09 ENCOUNTER — HOSPITAL ENCOUNTER (OUTPATIENT)
Dept: HOSPITAL 53 - SKLAB5 | Age: 85
End: 2020-07-09
Attending: INTERNAL MEDICINE

## 2020-07-09 DIAGNOSIS — D64.9: Primary | ICD-10-CM

## 2020-07-09 DIAGNOSIS — I10: ICD-10-CM

## 2020-07-09 LAB
BUN SERPL-MCNC: 13 MG/DL (ref 7–18)
CALCIUM SERPL-MCNC: 9.2 MG/DL (ref 8.8–10.2)
CHLORIDE SERPL-SCNC: 101 MEQ/L (ref 98–107)
CO2 SERPL-SCNC: 30 MEQ/L (ref 21–32)
CREAT SERPL-MCNC: 1.14 MG/DL (ref 0.55–1.3)
GFR SERPL CREATININE-BSD FRML MDRD: 48.2 ML/MIN/{1.73_M2} (ref 32–?)
GLUCOSE SERPL-MCNC: 107 MG/DL (ref 70–100)
HCT VFR BLD AUTO: 35.1 % (ref 36–47)
HGB BLD-MCNC: 11.2 G/DL (ref 12–15.5)
MCH RBC QN AUTO: 29.2 PG (ref 27–33)
MCHC RBC AUTO-ENTMCNC: 31.9 G/DL (ref 32–36.5)
MCV RBC AUTO: 91.6 FL (ref 80–96)
PLATELET # BLD AUTO: 364 10^3/UL (ref 150–450)
POTASSIUM SERPL-SCNC: 3.3 MEQ/L (ref 3.5–5.1)
RBC # BLD AUTO: 3.83 10^6/UL (ref 4–5.4)
SODIUM SERPL-SCNC: 139 MEQ/L (ref 136–145)
WBC # BLD AUTO: 9.3 10^3/UL (ref 4–10)

## 2020-07-14 ENCOUNTER — HOSPITAL ENCOUNTER (OUTPATIENT)
Dept: HOSPITAL 53 - SKLAB5 | Age: 85
End: 2020-07-14
Attending: INTERNAL MEDICINE

## 2020-07-14 DIAGNOSIS — E87.6: Primary | ICD-10-CM

## 2020-07-14 LAB
BUN SERPL-MCNC: 18 MG/DL (ref 7–18)
CALCIUM SERPL-MCNC: 9.1 MG/DL (ref 8.8–10.2)
CHLORIDE SERPL-SCNC: 101 MEQ/L (ref 98–107)
CO2 SERPL-SCNC: 31 MEQ/L (ref 21–32)
CREAT SERPL-MCNC: 0.94 MG/DL (ref 0.55–1.3)
GFR SERPL CREATININE-BSD FRML MDRD: > 60 ML/MIN/{1.73_M2} (ref 32–?)
GLUCOSE SERPL-MCNC: 96 MG/DL (ref 70–100)
POTASSIUM SERPL-SCNC: 3.8 MEQ/L (ref 3.5–5.1)
SODIUM SERPL-SCNC: 139 MEQ/L (ref 136–145)

## 2020-07-16 ENCOUNTER — HOSPITAL ENCOUNTER (OUTPATIENT)
Dept: HOSPITAL 53 - SKLAB5 | Age: 85
End: 2020-07-16
Attending: INTERNAL MEDICINE

## 2020-07-16 DIAGNOSIS — D64.9: Primary | ICD-10-CM

## 2020-07-16 LAB
HCT VFR BLD AUTO: 36.8 % (ref 36–47)
HGB BLD-MCNC: 11.7 G/DL (ref 12–15.5)
MCH RBC QN AUTO: 29.1 PG (ref 27–33)
MCHC RBC AUTO-ENTMCNC: 31.8 G/DL (ref 32–36.5)
MCV RBC AUTO: 91.5 FL (ref 80–96)
PLATELET # BLD AUTO: 365 10^3/UL (ref 150–450)
RBC # BLD AUTO: 4.02 10^6/UL (ref 4–5.4)
WBC # BLD AUTO: 7 10^3/UL (ref 4–10)

## 2020-07-17 ENCOUNTER — HOSPITAL ENCOUNTER (OUTPATIENT)
Dept: HOSPITAL 53 - SKLAB5 | Age: 85
End: 2020-07-17
Attending: INTERNAL MEDICINE

## 2020-07-17 DIAGNOSIS — R09.02: Primary | ICD-10-CM

## 2020-07-20 ENCOUNTER — HOSPITAL ENCOUNTER (OUTPATIENT)
Dept: HOSPITAL 53 - SKLAB5 | Age: 85
End: 2020-07-20
Attending: INTERNAL MEDICINE

## 2020-07-20 DIAGNOSIS — D64.9: Primary | ICD-10-CM

## 2020-07-20 LAB
HCT VFR BLD AUTO: 36 % (ref 36–47)
HGB BLD-MCNC: 11.6 G/DL (ref 12–15.5)
MCH RBC QN AUTO: 29.4 PG (ref 27–33)
MCHC RBC AUTO-ENTMCNC: 32.2 G/DL (ref 32–36.5)
MCV RBC AUTO: 91.1 FL (ref 80–96)
PLATELET # BLD AUTO: 328 10^3/UL (ref 150–450)
RBC # BLD AUTO: 3.95 10^6/UL (ref 4–5.4)
WBC # BLD AUTO: 9.3 10^3/UL (ref 4–10)